# Patient Record
Sex: MALE | Race: WHITE | NOT HISPANIC OR LATINO | Employment: FULL TIME | URBAN - METROPOLITAN AREA
[De-identification: names, ages, dates, MRNs, and addresses within clinical notes are randomized per-mention and may not be internally consistent; named-entity substitution may affect disease eponyms.]

---

## 2017-03-27 ENCOUNTER — OFFICE VISIT - HEALTHEAST (OUTPATIENT)
Dept: FAMILY MEDICINE | Facility: CLINIC | Age: 52
End: 2017-03-27

## 2017-03-27 DIAGNOSIS — Z00.00 ROUTINE GENERAL MEDICAL EXAMINATION AT A HEALTH CARE FACILITY: ICD-10-CM

## 2017-03-27 DIAGNOSIS — Z12.5 SCREENING FOR PROSTATE CANCER: ICD-10-CM

## 2017-03-27 LAB
CHOLEST SERPL-MCNC: 212 MG/DL
FASTING STATUS PATIENT QL REPORTED: YES
HDLC SERPL-MCNC: 74 MG/DL
LDLC SERPL CALC-MCNC: 125 MG/DL
PSA SERPL-MCNC: 1 NG/ML (ref 0–3.5)
TRIGL SERPL-MCNC: 64 MG/DL

## 2017-03-27 ASSESSMENT — MIFFLIN-ST. JEOR: SCORE: 1678.6

## 2017-03-28 ENCOUNTER — COMMUNICATION - HEALTHEAST (OUTPATIENT)
Dept: FAMILY MEDICINE | Facility: CLINIC | Age: 52
End: 2017-03-28

## 2017-06-29 ENCOUNTER — OFFICE VISIT - HEALTHEAST (OUTPATIENT)
Dept: FAMILY MEDICINE | Facility: CLINIC | Age: 52
End: 2017-06-29

## 2017-06-29 DIAGNOSIS — M54.50 ACUTE LEFT-SIDED LOW BACK PAIN WITHOUT SCIATICA: ICD-10-CM

## 2017-06-29 DIAGNOSIS — M25.512 ACUTE PAIN OF LEFT SHOULDER: ICD-10-CM

## 2017-06-29 ASSESSMENT — MIFFLIN-ST. JEOR: SCORE: 1683.71

## 2017-07-10 ENCOUNTER — OFFICE VISIT - HEALTHEAST (OUTPATIENT)
Dept: PHYSICAL THERAPY | Facility: REHABILITATION | Age: 52
End: 2017-07-10

## 2017-07-10 DIAGNOSIS — G89.29 CHRONIC BILATERAL LOW BACK PAIN WITHOUT SCIATICA: ICD-10-CM

## 2017-07-10 DIAGNOSIS — R53.1 DECREASED STRENGTH: ICD-10-CM

## 2017-07-10 DIAGNOSIS — M54.50 CHRONIC BILATERAL LOW BACK PAIN WITHOUT SCIATICA: ICD-10-CM

## 2017-07-10 DIAGNOSIS — M25.812 SHOULDER IMPINGEMENT, LEFT: ICD-10-CM

## 2017-07-18 ENCOUNTER — OFFICE VISIT - HEALTHEAST (OUTPATIENT)
Dept: PHYSICAL THERAPY | Facility: REHABILITATION | Age: 52
End: 2017-07-18

## 2017-07-18 DIAGNOSIS — G89.29 CHRONIC BILATERAL LOW BACK PAIN WITHOUT SCIATICA: ICD-10-CM

## 2017-07-18 DIAGNOSIS — M25.812 SHOULDER IMPINGEMENT, LEFT: ICD-10-CM

## 2017-07-18 DIAGNOSIS — R53.1 DECREASED STRENGTH: ICD-10-CM

## 2017-07-18 DIAGNOSIS — M54.50 CHRONIC BILATERAL LOW BACK PAIN WITHOUT SCIATICA: ICD-10-CM

## 2017-07-20 ENCOUNTER — OFFICE VISIT - HEALTHEAST (OUTPATIENT)
Dept: PHYSICAL THERAPY | Facility: REHABILITATION | Age: 52
End: 2017-07-20

## 2017-07-20 DIAGNOSIS — G89.29 CHRONIC BILATERAL LOW BACK PAIN WITHOUT SCIATICA: ICD-10-CM

## 2017-07-20 DIAGNOSIS — M25.812 SHOULDER IMPINGEMENT, LEFT: ICD-10-CM

## 2017-07-20 DIAGNOSIS — M54.50 CHRONIC BILATERAL LOW BACK PAIN WITHOUT SCIATICA: ICD-10-CM

## 2017-07-20 DIAGNOSIS — R53.1 DECREASED STRENGTH: ICD-10-CM

## 2017-07-25 ENCOUNTER — OFFICE VISIT - HEALTHEAST (OUTPATIENT)
Dept: PHYSICAL THERAPY | Facility: REHABILITATION | Age: 52
End: 2017-07-25

## 2017-07-25 DIAGNOSIS — M54.50 CHRONIC BILATERAL LOW BACK PAIN WITHOUT SCIATICA: ICD-10-CM

## 2017-07-25 DIAGNOSIS — G89.29 CHRONIC BILATERAL LOW BACK PAIN WITHOUT SCIATICA: ICD-10-CM

## 2017-07-25 DIAGNOSIS — R53.1 DECREASED STRENGTH: ICD-10-CM

## 2017-07-25 DIAGNOSIS — M25.812 SHOULDER IMPINGEMENT, LEFT: ICD-10-CM

## 2017-07-27 ENCOUNTER — OFFICE VISIT - HEALTHEAST (OUTPATIENT)
Dept: PHYSICAL THERAPY | Facility: REHABILITATION | Age: 52
End: 2017-07-27

## 2017-07-27 DIAGNOSIS — G89.29 CHRONIC BILATERAL LOW BACK PAIN WITHOUT SCIATICA: ICD-10-CM

## 2017-07-27 DIAGNOSIS — M25.812 SHOULDER IMPINGEMENT, LEFT: ICD-10-CM

## 2017-07-27 DIAGNOSIS — R53.1 DECREASED STRENGTH: ICD-10-CM

## 2017-07-27 DIAGNOSIS — M54.50 CHRONIC BILATERAL LOW BACK PAIN WITHOUT SCIATICA: ICD-10-CM

## 2017-08-01 ENCOUNTER — OFFICE VISIT - HEALTHEAST (OUTPATIENT)
Dept: PHYSICAL THERAPY | Facility: REHABILITATION | Age: 52
End: 2017-08-01

## 2017-08-01 DIAGNOSIS — M54.50 CHRONIC BILATERAL LOW BACK PAIN WITHOUT SCIATICA: ICD-10-CM

## 2017-08-01 DIAGNOSIS — G89.29 CHRONIC BILATERAL LOW BACK PAIN WITHOUT SCIATICA: ICD-10-CM

## 2017-08-01 DIAGNOSIS — R53.1 DECREASED STRENGTH: ICD-10-CM

## 2017-08-01 DIAGNOSIS — M25.812 SHOULDER IMPINGEMENT, LEFT: ICD-10-CM

## 2017-08-03 ENCOUNTER — HOSPITAL ENCOUNTER (OUTPATIENT)
Dept: PHYSICAL MEDICINE AND REHAB | Facility: CLINIC | Age: 52
Discharge: HOME | End: 2017-08-03
Attending: NURSE PRACTITIONER

## 2017-08-03 ENCOUNTER — OFFICE VISIT - HEALTHEAST (OUTPATIENT)
Dept: PHYSICAL THERAPY | Facility: REHABILITATION | Age: 52
End: 2017-08-03

## 2017-08-03 DIAGNOSIS — G47.9 SLEEP DIFFICULTIES: ICD-10-CM

## 2017-08-03 DIAGNOSIS — M25.812 SHOULDER IMPINGEMENT, LEFT: ICD-10-CM

## 2017-08-03 DIAGNOSIS — R53.1 DECREASED STRENGTH: ICD-10-CM

## 2017-08-03 DIAGNOSIS — M54.50 CHRONIC LEFT-SIDED LOW BACK PAIN WITHOUT SCIATICA: ICD-10-CM

## 2017-08-03 DIAGNOSIS — G89.29 CHRONIC LEFT-SIDED LOW BACK PAIN WITHOUT SCIATICA: ICD-10-CM

## 2017-08-08 ENCOUNTER — HOSPITAL ENCOUNTER (OUTPATIENT)
Dept: MRI IMAGING | Facility: CLINIC | Age: 52
Discharge: HOME | End: 2017-08-08

## 2017-08-08 DIAGNOSIS — G89.29 CHRONIC LEFT-SIDED LOW BACK PAIN WITHOUT SCIATICA: ICD-10-CM

## 2017-08-08 DIAGNOSIS — M54.50 CHRONIC LEFT-SIDED LOW BACK PAIN WITHOUT SCIATICA: ICD-10-CM

## 2017-08-17 ENCOUNTER — HOSPITAL ENCOUNTER (OUTPATIENT)
Dept: PHYSICAL MEDICINE AND REHAB | Facility: CLINIC | Age: 52
Discharge: HOME | End: 2017-08-17
Attending: NURSE PRACTITIONER

## 2017-08-17 DIAGNOSIS — M47.816 LUMBAR FACET ARTHROPATHY: ICD-10-CM

## 2017-08-17 DIAGNOSIS — M54.50 CHRONIC LEFT-SIDED LOW BACK PAIN WITHOUT SCIATICA: ICD-10-CM

## 2017-08-17 DIAGNOSIS — G89.29 CHRONIC LEFT-SIDED LOW BACK PAIN WITHOUT SCIATICA: ICD-10-CM

## 2017-08-21 ENCOUNTER — OFFICE VISIT - HEALTHEAST (OUTPATIENT)
Dept: PHYSICAL THERAPY | Facility: CLINIC | Age: 52
End: 2017-08-21

## 2017-08-21 DIAGNOSIS — M54.50 CHRONIC BILATERAL LOW BACK PAIN WITHOUT SCIATICA: ICD-10-CM

## 2017-08-21 DIAGNOSIS — G89.29 CHRONIC BILATERAL LOW BACK PAIN WITHOUT SCIATICA: ICD-10-CM

## 2017-08-21 DIAGNOSIS — R53.1 DECREASED STRENGTH: ICD-10-CM

## 2017-08-21 DIAGNOSIS — M25.812 SHOULDER IMPINGEMENT, LEFT: ICD-10-CM

## 2017-08-23 ENCOUNTER — AMBULATORY - HEALTHEAST (OUTPATIENT)
Dept: PHYSICAL MEDICINE AND REHAB | Facility: CLINIC | Age: 52
End: 2017-08-23

## 2017-08-23 DIAGNOSIS — M54.50 LUMBAR SPINE PAIN: ICD-10-CM

## 2017-08-28 ENCOUNTER — OFFICE VISIT - HEALTHEAST (OUTPATIENT)
Dept: PHYSICAL THERAPY | Facility: CLINIC | Age: 52
End: 2017-08-28

## 2017-08-28 DIAGNOSIS — R53.1 DECREASED STRENGTH: ICD-10-CM

## 2017-08-28 DIAGNOSIS — G89.29 CHRONIC BILATERAL LOW BACK PAIN WITHOUT SCIATICA: ICD-10-CM

## 2017-08-28 DIAGNOSIS — M54.50 CHRONIC BILATERAL LOW BACK PAIN WITHOUT SCIATICA: ICD-10-CM

## 2017-08-28 DIAGNOSIS — M25.812 SHOULDER IMPINGEMENT, LEFT: ICD-10-CM

## 2017-08-30 ENCOUNTER — OFFICE VISIT - HEALTHEAST (OUTPATIENT)
Dept: PHYSICAL THERAPY | Facility: CLINIC | Age: 52
End: 2017-08-30

## 2017-08-30 DIAGNOSIS — M25.812 SHOULDER IMPINGEMENT, LEFT: ICD-10-CM

## 2017-08-30 DIAGNOSIS — G89.29 CHRONIC BILATERAL LOW BACK PAIN WITHOUT SCIATICA: ICD-10-CM

## 2017-08-30 DIAGNOSIS — M54.50 CHRONIC BILATERAL LOW BACK PAIN WITHOUT SCIATICA: ICD-10-CM

## 2017-08-30 DIAGNOSIS — R53.1 DECREASED STRENGTH: ICD-10-CM

## 2017-09-06 ENCOUNTER — OFFICE VISIT - HEALTHEAST (OUTPATIENT)
Dept: PHYSICAL THERAPY | Facility: CLINIC | Age: 52
End: 2017-09-06

## 2017-09-06 DIAGNOSIS — M25.812 SHOULDER IMPINGEMENT, LEFT: ICD-10-CM

## 2017-09-06 DIAGNOSIS — R53.1 DECREASED STRENGTH: ICD-10-CM

## 2017-09-06 DIAGNOSIS — M54.50 CHRONIC BILATERAL LOW BACK PAIN WITHOUT SCIATICA: ICD-10-CM

## 2017-09-06 DIAGNOSIS — G89.29 CHRONIC BILATERAL LOW BACK PAIN WITHOUT SCIATICA: ICD-10-CM

## 2017-09-08 ENCOUNTER — OFFICE VISIT - HEALTHEAST (OUTPATIENT)
Dept: PHYSICAL THERAPY | Facility: CLINIC | Age: 52
End: 2017-09-08

## 2017-09-08 DIAGNOSIS — M54.50 CHRONIC BILATERAL LOW BACK PAIN WITHOUT SCIATICA: ICD-10-CM

## 2017-09-08 DIAGNOSIS — M25.812 SHOULDER IMPINGEMENT, LEFT: ICD-10-CM

## 2017-09-08 DIAGNOSIS — R53.1 DECREASED STRENGTH: ICD-10-CM

## 2017-09-08 DIAGNOSIS — G89.29 CHRONIC BILATERAL LOW BACK PAIN WITHOUT SCIATICA: ICD-10-CM

## 2017-09-20 ENCOUNTER — OFFICE VISIT - HEALTHEAST (OUTPATIENT)
Dept: PHYSICAL THERAPY | Facility: CLINIC | Age: 52
End: 2017-09-20

## 2017-09-20 ENCOUNTER — AMBULATORY - HEALTHEAST (OUTPATIENT)
Dept: FAMILY MEDICINE | Facility: CLINIC | Age: 52
End: 2017-09-20

## 2017-09-20 DIAGNOSIS — M25.812 SHOULDER IMPINGEMENT, LEFT: ICD-10-CM

## 2017-09-20 DIAGNOSIS — R53.1 DECREASED STRENGTH: ICD-10-CM

## 2017-09-20 DIAGNOSIS — G89.29 CHRONIC BILATERAL LOW BACK PAIN WITHOUT SCIATICA: ICD-10-CM

## 2017-09-20 DIAGNOSIS — M25.512 ACUTE PAIN OF LEFT SHOULDER: ICD-10-CM

## 2017-09-20 DIAGNOSIS — M54.50 CHRONIC BILATERAL LOW BACK PAIN WITHOUT SCIATICA: ICD-10-CM

## 2017-09-25 ENCOUNTER — OFFICE VISIT - HEALTHEAST (OUTPATIENT)
Dept: PHYSICAL THERAPY | Facility: CLINIC | Age: 52
End: 2017-09-25

## 2017-09-25 DIAGNOSIS — R53.1 DECREASED STRENGTH: ICD-10-CM

## 2017-09-25 DIAGNOSIS — M54.50 CHRONIC BILATERAL LOW BACK PAIN WITHOUT SCIATICA: ICD-10-CM

## 2017-09-25 DIAGNOSIS — G89.29 CHRONIC BILATERAL LOW BACK PAIN WITHOUT SCIATICA: ICD-10-CM

## 2017-09-25 DIAGNOSIS — M25.812 SHOULDER IMPINGEMENT, LEFT: ICD-10-CM

## 2017-09-26 ENCOUNTER — RECORDS - HEALTHEAST (OUTPATIENT)
Dept: ADMINISTRATIVE | Facility: OTHER | Age: 52
End: 2017-09-26

## 2017-09-27 ENCOUNTER — OFFICE VISIT - HEALTHEAST (OUTPATIENT)
Dept: PHYSICAL THERAPY | Facility: CLINIC | Age: 52
End: 2017-09-27

## 2017-09-27 DIAGNOSIS — G89.29 CHRONIC BILATERAL LOW BACK PAIN WITHOUT SCIATICA: ICD-10-CM

## 2017-09-27 DIAGNOSIS — M54.50 CHRONIC BILATERAL LOW BACK PAIN WITHOUT SCIATICA: ICD-10-CM

## 2017-09-27 DIAGNOSIS — R53.1 DECREASED STRENGTH: ICD-10-CM

## 2017-09-27 DIAGNOSIS — M25.812 SHOULDER IMPINGEMENT, LEFT: ICD-10-CM

## 2017-10-04 ENCOUNTER — OFFICE VISIT - HEALTHEAST (OUTPATIENT)
Dept: PHYSICAL THERAPY | Facility: CLINIC | Age: 52
End: 2017-10-04

## 2017-10-04 DIAGNOSIS — R53.1 DECREASED STRENGTH: ICD-10-CM

## 2017-10-04 DIAGNOSIS — M54.50 CHRONIC BILATERAL LOW BACK PAIN WITHOUT SCIATICA: ICD-10-CM

## 2017-10-04 DIAGNOSIS — M25.812 SHOULDER IMPINGEMENT, LEFT: ICD-10-CM

## 2017-10-04 DIAGNOSIS — G89.29 CHRONIC BILATERAL LOW BACK PAIN WITHOUT SCIATICA: ICD-10-CM

## 2017-10-09 ENCOUNTER — OFFICE VISIT - HEALTHEAST (OUTPATIENT)
Dept: PHYSICAL THERAPY | Facility: CLINIC | Age: 52
End: 2017-10-09

## 2017-10-09 DIAGNOSIS — R53.1 DECREASED STRENGTH: ICD-10-CM

## 2017-10-09 DIAGNOSIS — M25.812 SHOULDER IMPINGEMENT, LEFT: ICD-10-CM

## 2017-10-09 DIAGNOSIS — G89.29 CHRONIC BILATERAL LOW BACK PAIN WITHOUT SCIATICA: ICD-10-CM

## 2017-10-09 DIAGNOSIS — M54.50 CHRONIC BILATERAL LOW BACK PAIN WITHOUT SCIATICA: ICD-10-CM

## 2018-03-13 ENCOUNTER — COMMUNICATION - HEALTHEAST (OUTPATIENT)
Dept: TELEHEALTH | Facility: CLINIC | Age: 53
End: 2018-03-13

## 2018-03-13 ENCOUNTER — OFFICE VISIT - HEALTHEAST (OUTPATIENT)
Dept: FAMILY MEDICINE | Facility: CLINIC | Age: 53
End: 2018-03-13

## 2018-03-13 DIAGNOSIS — Z12.11 SCREEN FOR COLON CANCER: ICD-10-CM

## 2018-03-13 DIAGNOSIS — F32.A DEPRESSION: ICD-10-CM

## 2018-03-13 DIAGNOSIS — I10 ESSENTIAL HYPERTENSION: ICD-10-CM

## 2018-03-13 DIAGNOSIS — F41.9 ANXIETY: ICD-10-CM

## 2018-03-13 LAB
ALBUMIN SERPL-MCNC: 4 G/DL (ref 3.5–5)
ALP SERPL-CCNC: 58 U/L (ref 45–120)
ALT SERPL W P-5'-P-CCNC: 26 U/L (ref 0–45)
ANION GAP SERPL CALCULATED.3IONS-SCNC: 8 MMOL/L (ref 5–18)
AST SERPL W P-5'-P-CCNC: 20 U/L (ref 0–40)
BILIRUB SERPL-MCNC: 0.7 MG/DL (ref 0–1)
BUN SERPL-MCNC: 12 MG/DL (ref 8–22)
CALCIUM SERPL-MCNC: 9.7 MG/DL (ref 8.5–10.5)
CHLORIDE BLD-SCNC: 104 MMOL/L (ref 98–107)
CHOLEST SERPL-MCNC: 231 MG/DL
CO2 SERPL-SCNC: 27 MMOL/L (ref 22–31)
CREAT SERPL-MCNC: 0.79 MG/DL (ref 0.7–1.3)
FASTING STATUS PATIENT QL REPORTED: YES
GFR SERPL CREATININE-BSD FRML MDRD: >60 ML/MIN/1.73M2
GLUCOSE BLD-MCNC: 109 MG/DL (ref 70–125)
HDLC SERPL-MCNC: 74 MG/DL
LDLC SERPL CALC-MCNC: 139 MG/DL
POTASSIUM BLD-SCNC: 3.9 MMOL/L (ref 3.5–5)
PROT SERPL-MCNC: 7.1 G/DL (ref 6–8)
SODIUM SERPL-SCNC: 139 MMOL/L (ref 136–145)
TRIGL SERPL-MCNC: 90 MG/DL
TSH SERPL DL<=0.005 MIU/L-ACNC: 1.65 UIU/ML (ref 0.3–5)

## 2018-03-13 ASSESSMENT — MIFFLIN-ST. JEOR: SCORE: 1704.12

## 2018-03-14 ENCOUNTER — COMMUNICATION - HEALTHEAST (OUTPATIENT)
Dept: FAMILY MEDICINE | Facility: CLINIC | Age: 53
End: 2018-03-14

## 2018-03-24 ENCOUNTER — COMMUNICATION - HEALTHEAST (OUTPATIENT)
Dept: FAMILY MEDICINE | Facility: CLINIC | Age: 53
End: 2018-03-24

## 2018-03-24 DIAGNOSIS — Z00.00 PREVENTATIVE HEALTH CARE: ICD-10-CM

## 2018-03-28 ENCOUNTER — COMMUNICATION - HEALTHEAST (OUTPATIENT)
Dept: FAMILY MEDICINE | Facility: CLINIC | Age: 53
End: 2018-03-28

## 2018-03-28 ENCOUNTER — AMBULATORY - HEALTHEAST (OUTPATIENT)
Dept: NURSING | Facility: CLINIC | Age: 53
End: 2018-03-28

## 2018-04-10 ENCOUNTER — RECORDS - HEALTHEAST (OUTPATIENT)
Dept: ADMINISTRATIVE | Facility: OTHER | Age: 53
End: 2018-04-10

## 2018-04-18 ENCOUNTER — COMMUNICATION - HEALTHEAST (OUTPATIENT)
Dept: FAMILY MEDICINE | Facility: CLINIC | Age: 53
End: 2018-04-18

## 2018-05-11 ENCOUNTER — COMMUNICATION - HEALTHEAST (OUTPATIENT)
Dept: FAMILY MEDICINE | Facility: CLINIC | Age: 53
End: 2018-05-11

## 2018-05-11 DIAGNOSIS — I10 ESSENTIAL HYPERTENSION: ICD-10-CM

## 2018-10-03 ENCOUNTER — COMMUNICATION - HEALTHEAST (OUTPATIENT)
Dept: FAMILY MEDICINE | Facility: CLINIC | Age: 53
End: 2018-10-03

## 2018-10-05 ENCOUNTER — RECORDS - HEALTHEAST (OUTPATIENT)
Dept: ADMINISTRATIVE | Facility: OTHER | Age: 53
End: 2018-10-05

## 2019-03-01 ENCOUNTER — OFFICE VISIT - HEALTHEAST (OUTPATIENT)
Dept: FAMILY MEDICINE | Facility: CLINIC | Age: 54
End: 2019-03-01

## 2019-03-01 DIAGNOSIS — F41.9 ANXIETY: ICD-10-CM

## 2019-03-01 DIAGNOSIS — F33.1 MODERATE EPISODE OF RECURRENT MAJOR DEPRESSIVE DISORDER (H): ICD-10-CM

## 2019-03-01 DIAGNOSIS — B07.9 VIRAL WARTS, UNSPECIFIED TYPE: ICD-10-CM

## 2019-03-01 DIAGNOSIS — I10 ESSENTIAL HYPERTENSION: ICD-10-CM

## 2019-03-01 LAB
ANION GAP SERPL CALCULATED.3IONS-SCNC: 11 MMOL/L (ref 5–18)
BUN SERPL-MCNC: 12 MG/DL (ref 8–22)
CALCIUM SERPL-MCNC: 10.7 MG/DL (ref 8.5–10.5)
CHLORIDE BLD-SCNC: 103 MMOL/L (ref 98–107)
CO2 SERPL-SCNC: 28 MMOL/L (ref 22–31)
CREAT SERPL-MCNC: 0.93 MG/DL (ref 0.7–1.3)
GFR SERPL CREATININE-BSD FRML MDRD: >60 ML/MIN/1.73M2
GLUCOSE BLD-MCNC: 125 MG/DL (ref 70–125)
POTASSIUM BLD-SCNC: 4.7 MMOL/L (ref 3.5–5)
SODIUM SERPL-SCNC: 142 MMOL/L (ref 136–145)

## 2019-03-01 ASSESSMENT — MIFFLIN-ST. JEOR: SCORE: 1688.81

## 2019-04-09 ENCOUNTER — COMMUNICATION - HEALTHEAST (OUTPATIENT)
Dept: FAMILY MEDICINE | Facility: CLINIC | Age: 54
End: 2019-04-09

## 2019-05-10 ENCOUNTER — AMBULATORY - HEALTHEAST (OUTPATIENT)
Dept: FAMILY MEDICINE | Facility: CLINIC | Age: 54
End: 2019-05-10

## 2019-05-10 ENCOUNTER — COMMUNICATION - HEALTHEAST (OUTPATIENT)
Dept: FAMILY MEDICINE | Facility: CLINIC | Age: 54
End: 2019-05-10

## 2019-05-10 DIAGNOSIS — I10 ESSENTIAL HYPERTENSION: ICD-10-CM

## 2019-07-20 ENCOUNTER — COMMUNICATION - HEALTHEAST (OUTPATIENT)
Dept: FAMILY MEDICINE | Facility: CLINIC | Age: 54
End: 2019-07-20

## 2019-07-20 DIAGNOSIS — I10 ESSENTIAL HYPERTENSION: ICD-10-CM

## 2020-07-30 ENCOUNTER — COMMUNICATION - HEALTHEAST (OUTPATIENT)
Dept: FAMILY MEDICINE | Facility: CLINIC | Age: 55
End: 2020-07-30

## 2020-07-30 DIAGNOSIS — I10 ESSENTIAL HYPERTENSION: ICD-10-CM

## 2020-08-11 ENCOUNTER — OFFICE VISIT - HEALTHEAST (OUTPATIENT)
Dept: FAMILY MEDICINE | Facility: CLINIC | Age: 55
End: 2020-08-11

## 2020-08-11 DIAGNOSIS — Z12.5 SCREENING FOR PROSTATE CANCER: ICD-10-CM

## 2020-08-11 DIAGNOSIS — I10 ESSENTIAL HYPERTENSION: ICD-10-CM

## 2020-08-11 DIAGNOSIS — Z13.220 LIPID SCREENING: ICD-10-CM

## 2020-08-11 DIAGNOSIS — Z71.89 COUNSELING ON HEALTH CARE DIRECTIVE: ICD-10-CM

## 2020-08-11 DIAGNOSIS — F33.1 MODERATE EPISODE OF RECURRENT MAJOR DEPRESSIVE DISORDER (H): ICD-10-CM

## 2020-08-11 DIAGNOSIS — Z00.00 ANNUAL PHYSICAL EXAM: ICD-10-CM

## 2020-08-11 DIAGNOSIS — F41.9 ANXIETY: ICD-10-CM

## 2020-08-11 RX ORDER — LISINOPRIL 10 MG/1
TABLET ORAL
Qty: 90 TABLET | Refills: 3 | Status: SHIPPED | OUTPATIENT
Start: 2020-08-11 | End: 2021-09-05

## 2020-08-11 RX ORDER — CHLORAL HYDRATE 500 MG
2 CAPSULE ORAL DAILY
Status: SHIPPED | COMMUNITY
Start: 2020-08-11 | End: 2023-06-28

## 2020-08-11 ASSESSMENT — ANXIETY QUESTIONNAIRES
7. FEELING AFRAID AS IF SOMETHING AWFUL MIGHT HAPPEN: SEVERAL DAYS
1. FEELING NERVOUS, ANXIOUS, OR ON EDGE: SEVERAL DAYS
2. NOT BEING ABLE TO STOP OR CONTROL WORRYING: SEVERAL DAYS
6. BECOMING EASILY ANNOYED OR IRRITABLE: SEVERAL DAYS
5. BEING SO RESTLESS THAT IT IS HARD TO SIT STILL: SEVERAL DAYS
3. WORRYING TOO MUCH ABOUT DIFFERENT THINGS: SEVERAL DAYS
4. TROUBLE RELAXING: SEVERAL DAYS
IF YOU CHECKED OFF ANY PROBLEMS ON THIS QUESTIONNAIRE, HOW DIFFICULT HAVE THESE PROBLEMS MADE IT FOR YOU TO DO YOUR WORK, TAKE CARE OF THINGS AT HOME, OR GET ALONG WITH OTHER PEOPLE: SOMEWHAT DIFFICULT
GAD7 TOTAL SCORE: 7

## 2020-08-11 ASSESSMENT — PATIENT HEALTH QUESTIONNAIRE - PHQ9: SUM OF ALL RESPONSES TO PHQ QUESTIONS 1-9: 10

## 2020-08-11 ASSESSMENT — MIFFLIN-ST. JEOR: SCORE: 1603.76

## 2020-08-12 LAB
ANION GAP SERPL CALCULATED.3IONS-SCNC: 12 MMOL/L (ref 5–18)
BUN SERPL-MCNC: 16 MG/DL (ref 8–22)
CALCIUM SERPL-MCNC: 9.3 MG/DL (ref 8.5–10.5)
CHLORIDE BLD-SCNC: 109 MMOL/L (ref 98–107)
CHOLEST SERPL-MCNC: 206 MG/DL
CO2 SERPL-SCNC: 20 MMOL/L (ref 22–31)
CREAT SERPL-MCNC: 0.87 MG/DL (ref 0.7–1.3)
FASTING STATUS PATIENT QL REPORTED: NO
GFR SERPL CREATININE-BSD FRML MDRD: >60 ML/MIN/1.73M2
GLUCOSE BLD-MCNC: 95 MG/DL (ref 70–125)
HDLC SERPL-MCNC: 70 MG/DL
LDLC SERPL CALC-MCNC: 111 MG/DL
POTASSIUM BLD-SCNC: 4 MMOL/L (ref 3.5–5)
PSA SERPL-MCNC: 1.3 NG/ML (ref 0–3.5)
SODIUM SERPL-SCNC: 141 MMOL/L (ref 136–145)
TRIGL SERPL-MCNC: 126 MG/DL

## 2021-05-27 ASSESSMENT — PATIENT HEALTH QUESTIONNAIRE - PHQ9: SUM OF ALL RESPONSES TO PHQ QUESTIONS 1-9: 10

## 2021-05-28 ASSESSMENT — ANXIETY QUESTIONNAIRES: GAD7 TOTAL SCORE: 7

## 2021-05-30 VITALS — HEIGHT: 71 IN | WEIGHT: 180 LBS | BODY MASS INDEX: 25.2 KG/M2

## 2021-05-31 VITALS — BODY MASS INDEX: 25.14 KG/M2 | WEIGHT: 179 LBS

## 2021-05-31 VITALS — WEIGHT: 182 LBS | BODY MASS INDEX: 25.48 KG/M2 | HEIGHT: 71 IN

## 2021-05-31 VITALS — WEIGHT: 181 LBS | BODY MASS INDEX: 25.42 KG/M2

## 2021-06-01 VITALS — BODY MASS INDEX: 26.11 KG/M2 | HEIGHT: 71 IN | WEIGHT: 186.5 LBS

## 2021-06-02 VITALS — BODY MASS INDEX: 25.76 KG/M2 | HEIGHT: 71 IN | WEIGHT: 184 LBS

## 2021-06-04 VITALS
HEART RATE: 63 BPM | WEIGHT: 167 LBS | OXYGEN SATURATION: 97 % | DIASTOLIC BLOOD PRESSURE: 80 MMHG | BODY MASS INDEX: 23.91 KG/M2 | HEIGHT: 70 IN | SYSTOLIC BLOOD PRESSURE: 136 MMHG
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
## 2021-06-10 NOTE — TELEPHONE ENCOUNTER
Pt notified due for visit to [facility] with a new provider- appt made for 8/11 with annabella morales. Please adv for possible short refill? Last labs were done in 2018.

## 2021-06-10 NOTE — PROGRESS NOTES
Assessment:      Healthy male exam.      Plan:     1. Annual physical exam     2. Essential hypertension  Basic Metabolic Panel    lisinopriL (PRINIVIL,ZESTRIL) 10 MG tablet   3. Anxiety     4. Moderate episode of recurrent major depressive disorder (H)     5. Counseling on health care directive     6. Screening for prostate cancer  PSA, Annual Screen (Prostatic-Specific Antigen)   7. Lipid screening  Lipid Cascade RANDOM     Overall doing well.  Update labs.  Screening PSA.  Refill lisinopril sent to the pharmacy.  Check back again next year.    Subjective:      García Trevizo is a 55 y.o. male who presents for an annual exam. The patient reports that there is not domestic violence in his life.     Overall things are going well.  No specific concerns.  Cologuard negative in 2018.  Has lost about 15 pounds over the last year.  Blood pressure under good control with lisinopril.  History of anxiety and depression, but managing well without medication.    PHQ-9 Total Score: 10 (8/11/2020  5:00 PM)  JV 7 Total Score: 7 (8/11/2020  5:00 PM)    Healthy Habits:   Regular Exercise: Yes  Sunscreen Use: No  Healthy Diet: Moderatly healthy  Dental Visits Regularly: No  Seat Belt: Yes  Sexually active: Yes  Monthly Self Testicular Exams:  Yes  Hemoccults: No  Flex Sig: No  Colonoscopy: Had negative cologuard in 2018.  Lipid Profile: Yes  Glucose Screen: Yes  Prevention of Osteoporosis: Takes a multivitamin.   Last Dexa: No  Guns at Home:  Yes  Guns Safety Locks:  No and Gun safe/class:  No      Immunization History   Administered Date(s) Administered     Influenza,seasonal quad, PF, =/> 6months 11/05/2017     Td, Adult, Absorbed 07/31/2003, 04/10/2006     Tdap 03/27/2017     Immunization status: up to date and documented.    No exam data present    Current Outpatient Medications   Medication Sig Dispense Refill     fish oil-omega-3 fatty acids (FISH OIL) 300-1,000 mg capsule Take 2 g by mouth daily.       lisinopriL  (PRINIVIL,ZESTRIL) 10 MG tablet TAKE 1 TABLET DAILY 90 tablet 3     MULTIVIT-MINERALS/FERROUS FUM (MULTI VITAMIN ORAL) Take by mouth.       No current facility-administered medications for this visit.      Past Medical History:   Diagnosis Date     Anxiety      Hydrocele      Past Surgical History:   Procedure Laterality Date     FINGER AMPUTATION Left     Press accident, 3 fingers     HYDROCELE EXCISION / REPAIR       Patient has no known allergies.  Family History   Problem Relation Age of Onset     Lung cancer Mother          at 61     Hypertension Father      Diabetes Father      Heart attack Father          2017     Diabetes Sister      Anxiety disorder Sister      Social History     Socioeconomic History     Marital status:      Spouse name: Not on file     Number of children: 3     Years of education: 14     Highest education level: Not on file   Occupational History     Occupation:      Comment: Resigned 3/2017   Social Needs     Financial resource strain: Not on file     Food insecurity     Worry: Not on file     Inability: Not on file     Transportation needs     Medical: Not on file     Non-medical: Not on file   Tobacco Use     Smoking status: Former Smoker     Last attempt to quit: 3/27/2007     Years since quittin.3     Smokeless tobacco: Never Used     Tobacco comment: very light smoker , only 1  year    Substance and Sexual Activity     Alcohol use: Not Currently     Drug use: No     Sexual activity: Yes     Partners: Female   Lifestyle     Physical activity     Days per week: Not on file     Minutes per session: Not on file     Stress: Not on file   Relationships     Social connections     Talks on phone: Not on file     Gets together: Not on file     Attends Bahai service: Not on file     Active member of club or organization: Not on file     Attends meetings of clubs or organizations: Not on file     Relationship status: Not on file     Intimate  "partner violence     Fear of current or ex partner: Not on file     Emotionally abused: Not on file     Physically abused: Not on file     Forced sexual activity: Not on file   Other Topics Concern     Not on file   Social History Narrative    Lives with female partner, 3 sons are independent and employed.         Review of Systems  General:  Denies problem  Eyes: Denies problem  Ears/Nose/Throat: Denies problem  Cardiovascular: Denies problem  Respiratory:  Denies problem  Gastrointestinal:  Denies problem  Genitourinary: Denies problem  Musculoskeletal:  Denies problem  Skin: Denies problem  Neurologic: Denies problem  Psychiatric: Denies problem  Endocrine: Denies problem  Heme/Lymphatic: Denies problem   Allergic/Immunologic: Denies problem        Objective:     Vitals:    08/11/20 1626   BP: 136/80   Pulse: 63   SpO2: 97%   Weight: 167 lb (75.8 kg)   Height: 5' 10\" (1.778 m)     Body mass index is 23.96 kg/m .    Physical  General Appearance: Alert, cooperative, no distress, appears stated age  Head: Normocephalic, without obvious abnormality, atraumatic  Eyes: PERRL, conjunctiva/corneas clear, EOM's intact  Ears: Normal TM's and external ear canals, both ears  Nose: Nares normal, septum midline,mucosa normal, no drainage  Throat: Lips, mucosa, and tongue normal; teeth and gums normal  Neck: Supple, symmetrical, trachea midline, no adenopathy;  thyroid: not enlarged, symmetric, no tenderness/mass/nodules; no carotid bruit or JVD  Back: Symmetric, no curvature, ROM normal, no CVA tenderness  Lungs: Clear to auscultation bilaterally, respirations unlabored  Heart: Regular rate and rhythm, S1 and S2 normal, no murmur, rub, or gallop,  Abdomen: Soft, non-tender, bowel sounds active all four quadrants,  no masses, no organomegaly  Genitourinary: Deferred   Musculoskeletal: Normal range of motion. No joint swelling or deformity.   Extremities: Extremities normal, atraumatic, no cyanosis or edema  Skin: Skin color, " texture, turgor normal, no rashes or lesions  Lymph nodes: Cervical, supraclavicular, and axillary nodes normal  Neurologic: He is alert. He has normal reflexes.   Psychiatric: He has a normal mood and affect.      Kye Green, CNP

## 2021-06-10 NOTE — TELEPHONE ENCOUNTER
I can certainly do a 30-day refill.  Does he want it sent to a local pharmacy so he can actually pick it up?  I see [facility] listed as the main pharmacy.    Kye Green, CNP

## 2021-06-10 NOTE — TELEPHONE ENCOUNTER
RN cannot approve Refill Request    RN can NOT refill this medication No established PCP. Last office visit: Visit date not found Last Physical: 3/27/2017 Last MTM visit: Visit date not found Last visit same specialty: 3/1/2019 Jovanna Whitaker NP.  Next visit within 3 mo: Visit date not found  Next physical within 3 mo: Visit date not found      Tiff Agudelo, [facility] Triage/Med Refill 8/1/2020    Requested Prescriptions   Pending Prescriptions Disp Refills     lisinopriL (PRINIVIL,ZESTRIL) 10 MG tablet [Pharmacy Med Name: LISINOPRIL TABS 10MG] 90 tablet 3     Sig: TAKE 1 TABLET DAILY       [facility] Inhibitors Refill Protocol Failed - 7/30/2020  6:08 PM        Failed - PCP or prescribing provider visit in past 12 months       Last office visit with prescriber/PCP: Visit date not found OR same dept: Visit date not found OR same specialty: 3/1/2019 Jovanna Whitaker NP  Last physical: 3/27/2017 Last MTM visit: Visit date not found   Next visit within 3 mo: Visit date not found  Next physical within 3 mo: Visit date not found  Prescriber OR PCP: Shira Ellington MD  Last diagnosis associated with med order: 1. Essential hypertension  - lisinopriL (PRINIVIL,ZESTRIL) 10 MG tablet [Pharmacy Med Name: LISINOPRIL TABS 10MG]; TAKE 1 TABLET DAILY  Dispense: 90 tablet; Refill: 3    If protocol passes may refill for 12 months if within 3 months of last provider visit (or a total of 15 months).             Failed - Serum Potassium in past 12 months     No results found for: LN-POTASSIUM          Failed - Blood pressure filed in past 12 months     BP Readings from Last 1 Encounters:   03/01/19 124/84             Failed - Serum Creatinine in past 12 months     Creatinine   Date Value Ref Range Status   03/01/2019 0.93 0.70 - 1.30 mg/dL Final

## 2021-06-10 NOTE — PATIENT INSTRUCTIONS - HE
"That shingles vaccine we talked about is called \"Shingrix\". Check with your insurance to see if they cover it. If they do and you're interested in getting it, let me know and we can have you come in for just the shot without having to see me.    Refills of lisinopril sent to the pharmacy.  "

## 2021-06-11 NOTE — PROGRESS NOTES
[facility] Daily Progress     Patient Name: García Trevizo  Date: 2017  Visit #:   [employer] Authorization date 10/8/17  Referral Diagnosis:  Acute left sided low back pain without sciatica, left shoulder pain.  Referring provider: Shira Ellington MD  Visit Diagnosis:     ICD-10-CM    1. Shoulder impingement, left M75.42    2. Chronic bilateral low back pain without sciatica M54.5     G89.29    3. Decreased strength M62.81          Assessment:     Response to Intervention:  Patient reports no increased pain.  He indicates he feels better today than after the first visit.    Symptoms are consistent with:    Patient is appropriate to continue with skilled physical therapy intervention, as indicated by initial plan of care.    Goal Status:  Pt. will demonstrate/verbalize independence in self-management of condition in : 4 weeks  Pt. will be independent with home exercise program in : 4 weeks  Pt. will have improved quality of sleep: with less pain;waking less times/night;in 4 weeks  Pt. will be able to walk : 20 minutes;with no pain;for exercise/recreation;in 4 weeks  Patient will stand : 30 minutes;with less pain;with less difficultty;for home chores;in 4 weeks  Patient will sit: 30 minutes;for eating;with no pain;in 4 weeks  Patient will reach / maintain arm movement: forward;overhead;with no pain;in 4 weeks  No Data Recorded  Other functional progress:          Plan / Patient Education:     Continue with initial plan of care.  Progress with home program as tolerated.  Scapular stabilization exercises, progress core, leg pull trial.    Subjective:     Pain Ratin    Back seems about the same.    Shoulder seems a little better.    Fishing off the dock.  After a while the low back cramped up.      Objective:       Significantly limited rectus femoris and hip flexor    Manual Therapy  Lumbar skin roll  MFR layers 1-3 lumbar paraspinals, left glut max,       Manual therapy:  Right rotational lumbar  "mobilization    Rate/grade Target  Direction  Relative movement Location in range Patient position   2 Posterior femur/pelvis Along the longitudinal axis of the femur   L5 rotating right on L4 With 45 degrees bilateral hip flexion and 90 degrees knee flexion Right side-lying.       Manual therapy:  Bilateral LE longitudinal lumbar mobilization.    Rate/grade Target  Direction  Relative movement Location in range Patient position   2 Hands around tibia and fibula of lower legs, superior to ankles Inferior force  Inferior distraction of Lumbar facet joints 20-30 degrees of hip flexion. Supine           Exercises below represent all exercise the patient has done in the clinic and HEP.  Please see today's exercise flow-sheet for specifics of today's exercise performance.     Exercises:  Exercise #1: manual stretching quad, hip flexor, piriformis  Comment #1: 30\" x 2 bilateral  Exercise #2: quad, hamstring, hip flexor, and piriformis stretch   Comment #2:  30\" bilateral each  Exercise #3: pec stretch-cues for keeping weight on feet and not leaning, reduced intiensity for stretch.  Comment #3: 30\" x 2   Exercise #4: scapular retraction  Comment #4: 5  Exercise #5: sleeper stretch cues for arm angle and intensity.  Comment #5: 30\" x 2   Exercise #6: pelvic tilt  Comment #6: 5\" x 10  Exercise #7: bridging low amplitude  Comment #7: x 10       Treatment Today    TREATMENT MINUTES COMMENTS   Evaluation     Self-care/ Home management     Manual therapy 25 See above.   Neuromuscular Re-education     Therapeutic Activity     Therapeutic Exercises 30 See exercise flow-sheet for details.    Gait training     Modality__________________                Total 55    Blank areas are intentional and mean the treatment did not include these items.       Bear Ma, PT  7/18/2017     "

## 2021-06-11 NOTE — PROGRESS NOTES
[facility]   [facility]-Pelvic Initial Evaluation    Patient Name: García Trevizo  Date of evaluation: 7/10/2017  Referral Diagnosis: Acute left-sided low back pain without sciatica  Referring provider: Jovanna Whitaker NP  Visit Diagnosis:     ICD-10-CM    1. Shoulder impingement, left M75.42    2. Chronic bilateral low back pain without sciatica M54.5     G89.29    3. Decreased strength M62.81        Assessment:      Impairments in  pain, posture, ROM, joint mobility, strength  Patient's signs and symptoms are consistent with lower crossed syndrome and left shoulder impingement.  Patient responded well to manual stretching and exercises.  Prognosis to achieve goals is  good   Pt. is appropriate for skilled PT intervention as outlined in the Plan of Care (POC).    Goals:  Pt. will demonstrate/verbalize independence in self-management of condition in : 4 weeks  Pt. will be independent with home exercise program in : 4 weeks  Pt. will have improved quality of sleep: with less pain;waking less times/night;in 4 weeks  Pt. will be able to walk : 20 minutes;with no pain;for exercise/recreation;in 4 weeks  Patient will stand : 30 minutes;with less pain;with less difficultty;for home chores;in 4 weeks  Patient will sit: 30 minutes;for eating;with no pain;in 4 weeks  Patient will reach / maintain arm movement: forward;overhead;with no pain;in 4 weeks  No Data Recorded    Patient's expectations/goals are realistic.    Barriers to Learning or Achieving Goals:  No Barriers.       Plan / Patient Instructions:        Plan of Care:   Authorization / Certification Start Date: 07/10/17  Authorization / Certification End Date: 10/08/17  Authorization / Certification Number of Visits: 12  Communication with: Referral Source  Patient Related Instruction: Nature of Condition;Treatment plan and rationale;Self Care instruction;Basis of treatment;Expected outcome;Next steps;Precautions;Posture;Body mechanics  Times per Week:  2  Number of Weeks: 6  Number of Visits: 12  Discharge Planning:    Precautions / Restrictions : h/o left fingers amputated.  Therapeutic Exercise: ROM;Stretching;Strengthening  Neuromuscular Reeducation: kinesio tape;posture;TNE;core  Manual Therapy: soft tissue mobilization;myofascial release;joint mobilization;muscle energy    POC and pathology of condition were reviewed with patient.  Pt. is in agreement with the Plan of Care  A Home Exercise Program (HEP) was initiated today.  Pt. was instructed in exercises by PT and patient was given a handout with detailed instructions.    Plan for next visit: recheck ROM, review HEP and continue manual therapy.     Subjective:           History of Present Illness:    García is a 51 y.o. male who presents to therapy today with complaints of left sided acute on chronic low  Back pain and left shoulder pain. Date of onset:  2017. Onset was gradual. Symptoms are intermittent and not improving. He reports  an episodic  history of similar symptoms. He describes their previous level of function as not limited    Pain Ratin  Pain rating at best: 5  Pain rating at worst: 9  Pain description: aching, dull, pain, sharp and soreness    Functional limitations are described as occurring with:   ascending and descending stairs or curbs  bending  lifting  reaching overhead  sitting    sleeping  standing    transitional movements sit to stand and sit to supine  walking             Objective:      Note: Items left blank indicates the item was not performed or not indicated at the time of the evaluation.    Patient Outcome Measures :    Modified Oswestry Low Back Pain Disablity Questionnaire  in %: 46   Scores range from 0-100%, where a score of 0% represents minimal pain and maximal function. The minimal clinically important difference is a score reduction of 12%.    Examination  1. Shoulder impingement, left     2. Chronic bilateral low back pain without sciatica     3. Decreased  strength       Precautions / Restrictions : h/o left fingers amputated.   Involved side: Left and Bilateral  Posture Observation:      Cervical:  Mild forward head  Shoulder/Thoracic complex: Moderate bilateral scapular protraction   Lumbopelvic complex: Moderately increased lumbar lordosis    Lumbar ROM:    Date: 7/10/17     *Indicate scale AROM AROM AROM   Lumbar Flexion mod     Lumbar Extension mod      Right Left Right Left Right Left   Lumbar Sidebending min min       Lumbar Rotation min min       Thoracic Flexion      Thoracic Extension      Thoracic Sidebending         Thoracic Rotation           Lower Extremity Strength:    Date: 7/10/17     LE strength/5 Right Left Right Left Right Left   Hip Flexion (L1-3) 5 5       Hip Extension (L5-S1) 4 4       Hip Abduction (L4-5) 4+ 4       Hip Adduction (L2-3) 4 4       Hip External Rotation         Hip Internal Rotation         Knee Extension (L3-4) 5 5       Knee Flexion 5 5       Ankle Dorsiflexion (L4-5) 5 5       Great Toe Extension (L5) 5 5       Ankle Plantar flexion (S1)         Abdominals Fair to good       Sensation    NT       Reflex Testing:  NT  Lumbar Dermatomes Right Left UE Reflexes Right Left   Iliac Crest and Groin (L1)   Biceps (C5-6)     Anterior Medial Thigh (L2)   Brachioradialis (C5-6)     Anterior Thigh, Medial Epicondyle Femur (L3)   Triceps (C7-8)     Lateral Thigh, Anterior Knee, Medial Leg/Malleolus (L4)   Tonny s test     Lateral Leg, Dorsal Foot (L5)   LE Reflexes     Lateral Foot (S1)   Patellar (L3-4)     Posterior Leg (S2)   Achilles (S1-2)     Other:   Babinski Response         Lumbar Special Tests:      Lumbar Special Tests Right Left SI Tests Right  Left   Quadrant test   SI Compression     Straight leg raise - - SI Distraction     Crossover response - - POSH Test - -   Slump - - Sacral Thrust - -   Sit-up test  FADIR     Trunk extensor endurance test  Resisted Abduction     Prone instability test  Other:     Pubic shotgun -  Other:       Repeated Motion Testing:  NT    Passive Mobility - Joint Integrity:  Hypomobile    LE Screen:  Hip PROM:  Decreased extension and IR bilateral .  Hip Scour:  Neg bilateral.      Shoulder/Elbow ROM  Date:      Shoulder and Elbow ROM ( )   AROM in degrees AROM in degrees AROM in degrees    Right Left Right Left Right Left   Shoulder Flexion (0-180 ) 150 150 with pain       Shoulder Abduction (0-180 ) 150 150 with pain       Shoulder Extension (0-60 )         Shoulder ER (0-90 ) 75 65 with minimal pain       Shoulder IR (0-70 )         Shoulder IR/EXT L1 L1 with pain       Elbow Flexion (150 )         Elbow Extension (0 )            Shoulder/Elbow Strength  Date:      Shoulder/Elbow Strength (/5)  Manual Muscle Test (MMT) MMT MMT MMT    Right Left Right Left Right Left   Shoulder Flexion 5 5       Supraspinatus         Shoulder Abduction 5 5       Shoulder Extension 5 5       Shoulder External Rotation 5 5       Shoulder Internal Rotation 5 5       Elbow Flexion 5 5       Elbow Extension 5 5       Other:         Other:             Shoulder Special Tests  Impingement Cluster Right (+/-) Left (+/-) Rotator Cuff Tests Right (+/-) Left (+/-)   Ureña-Christoph  + Drop Arm Sign  -   Painful Arc  + Hornblowers     Infraspinatus Test  + ERLS     AC Tests Right (+/-) Left (+/-) IRLS     Active Compression   Labral Tests Right (+/-) Left (+/-)   Crossbody Adduction  - Biceps Load Test II     AC Resisted Extension   Jerk Test     GH Instability Tests Right (+/-) Left (+/-) Gena Test     Sulcus Sign  - Biceps Tests Right (+/-) Left (+/-)   Apprehension   Speed     Relocation   Dayami zuniga     Other:   Other:     Other:   Other:       Palpation:  Quad, glut max/med, piriformis, hamstring, pec minor, deltoid tender with restriction    Flexibility:  pec minor, hip flexor, quad limitation bilateral.    MFR layers 1-3 left lumbar paraspinals, glut max/med, piriformis, quad and hamstrings      Treatment Today      TREATMENT  MINUTES COMMENTS   Evaluation 30    Self-care/ Home management     Manual therapy 15 See above.   Neuromuscular Re-education     Therapeutic Activity     Therapeutic Exercises 12 See exercise flow-sheet for details.    Gait training     Modality__________________                Total 57    Blank areas are intentional and mean the treatment did not include these items.     PT Evaluation Code: (Please list factors)  Patient History/Comorbidities: none  Examination: see above  Clinical Presentation:  stable  Clinical Decision Making: low.    Patient History/  Comorbidities Examination  (body structures and functions, activity limitations, and/or participation restrictions) Clinical Presentation Clinical Decision Making (Complexity)   No documented Comorbidities or personal factors 1-2 Elements Stable and/or uncomplicated Low   1-2 documented comorbidities or personal factor 3 Elements Evolving clinical presentation with changing characteristics Moderate   3-4 documented comorbidities or personal factors 4 or more Unstable and unpredictable High               Bear Ma, PT  7/10/2017  10:04 AM

## 2021-06-11 NOTE — PROGRESS NOTES
Assessment:        1. Acute left-sided low back pain without sciatica    - Ambulatory referral to Physical Therapy,    2. Acute pain of left shoulder    - Ambulatory referral to Physical Therapy    3. Elevated Blood Pressure    -continue to work on weight loss and exercise as able. Will have BP re-assessed at next follow up appointment           Plan:     Visual aid of several stretching exercises given to patient to do at home. Patient instructed to david exercises that he found relieved the pain and note which ones made it worse.   For acute pain, rest, intermittent application of cold packs (later, may switch to heat, but do not sleep on heating pad), analgesics are recommended. Discussed longer term treatment plan of prn NSAID's. Proper lifting with avoidance of heavy lifting discussed. Consider XRay studies if not improving. Call or return to clinic prn if these symptoms worsen or fail to improve as anticipated. Physical Therapy referral completed.    Patient instructed to make an appointment for his March annual physical. Will address BP again at that time to see if it has normalized. Discussed colorectal screening recommendations with patient. He will schedule this after he finds employment and obtains insurance. He wants to focus on figuring out his M/S pain and getting BP under control for now.    40 minutes spent together with the patient today, more than 50% spent in counseling, discussing the above topics.        Subjective:      García Trevizo is a 51 y.o. male who presents with left shoulder pain. The symptoms began 8 months ago. Aggravating factors: no known event. Pain is located subdeltoid region. Discomfort is described as aching and sharp/stabbing. Symptoms are exacerbated by repetitive movements and overhead movements. Also occurs when he raises his arms straight up in front of him. Evaluation to date: none. Therapy to date includes: avoidance of offending activity. He also complains of  "low back pain that initially started 25 year(s) ago. 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 For the last 8 years, the pain has been manageable up until now.The pain is positional with bending or lifting, without radiation down the legs. Mechanism of injury: unknown, no history of accidents or trauma. He was told in his 20's that he had some sort of herniated disc but could not recall the specific details of his findings. Symptoms have been gradual to now constant. Prior history of back problems: recurrent self limited episodes of low back pain in the past and previous herniated disc. There is no numbness in the legs. He did have radiating pain down the back of his left leg many years ago, but, has not returned since. 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 He is now trying to decide what he wants to do. He is currently carrying [employer] coverage. On Monday, July 3rd 2017, it will switch to [employer] coverage. He has been dealing with many stressors related to his job and his father recently passed away 2 weeks ago from a heart attack. The patient does have a history of elevated blood pressure but it has not been formally diagnosed. He states that in the past he was able to control it as long as he stayed at a healthy weight. He is up about 17 pounds from where he typically is (170). He also has a history of right elbow tendonitis which he used to where a brace for. At one point, the pain was so bad at his previous job that he was using a mouse for each hand while he was on his computer. He did end up leaving that job due to several stressors and the pain. He has not needed to wear his brace in a very long time and he denies any recent pain in the left elbow.         Review of Systems  Musculoskeletal:positive for back pain and stiff joints and left shoulder pain. Denies fevers, chills, swelling, numbness or tingling.         Objective:         /80 (Patient Site: Left Arm)  Pulse 70  Ht 5' 10.75\" (1.797 m)  Wt " 182 lb (82.6 kg)  BMI 25.56 kg/m2     Physical Exam:  General Appearance: Alert, cooperative, no distress, appears stated age  Head: Normocephalic, without obvious abnormality, atraumatic  Neck: Supple, symmetrical, trachea midline, no adenopathy;  thyroid: not enlarged, symmetric, no tenderness/mass/nodules; no carotid bruit or JVD  Back: Symmetric, no curvature, ROM normal, no CVA tenderness  Lungs: Clear to auscultation bilaterally, respirations unlabored  Heart: Regular rate and rhythm, S1 and S2 normal, no murmur, rub, or gallop  Abdomen: Soft, non-tender, bowel sounds active all four quadrants,  no masses, no organomegaly  Musculoskeletal: Patient appears to be in moderate pain. Lumbosacral spine area reveals  local tenderness on left side with palpation. No mass. Painful and reduced LS ROM noted. Straight leg raise is positive at 45 degrees with right leg (pain felt in lower left back). Limited ROM with spinal extension, bilateral spinal rotation and right lateral side bend. LEFT SHOULDER: Increased pain with pronation, abduction, adduction, and internal rotation. positive Ureña test  Skin: Skin color, texture, turgor normal, no rashes or lesions  Lymph nodes: Cervical, supraclavicular, and axillary nodes normal  Neurologic: DTR's, motor strength and sensation normal, pain increased with toe to heel gait. Peripheral pulses are palpable.      Jovanna Whitaker NP

## 2021-06-12 NOTE — PROGRESS NOTES
[facility] Daily Progress     Patient Name: García Trevizo  Date: 2017  Visit #: 3/12  [employer] Authorization date 10/8/17  Referral Diagnosis:  Acute left sided low back pain without sciatica, left shoulder pain.  Referring provider: Shira Ellington MD  Visit Diagnosis:     ICD-10-CM    1. Shoulder impingement, left M75.42    2. Chronic bilateral low back pain without sciatica M54.5     G89.29    3. Decreased strength M62.81          Assessment:     Response to Intervention:  Patient reports significant improvement in back pain post treatment.    Symptoms are consistent with:    Patient is appropriate to continue with skilled physical therapy intervention, as indicated by initial plan of care.    Goal Status:  Pt. will demonstrate/verbalize independence in self-management of condition in : 4 weeks  Pt. will be independent with home exercise program in : 4 weeks  Pt. will have improved quality of sleep: with less pain;waking less times/night;in 4 weeks  Pt. will be able to walk : 20 minutes;with no pain;for exercise/recreation;in 4 weeks  Patient will stand : 30 minutes;with less pain;with less difficultty;for home chores;in 4 weeks  Patient will sit: 30 minutes;for eating;with no pain;in 4 weeks  Patient will reach / maintain arm movement: forward;overhead;with no pain;in 4 weeks  No Data Recorded  Other functional progress:          Plan / Patient Education:     Continue with initial plan of care.  Progress with home program as tolerated.  Scapular stabilization exercises, progress core, leg pull trial.    Subjective:     Pain Ratin    Back seems about the same.    Shoulder seems a little better.    Fishing off the dock.  After a while the low back cramped up.      Objective:       Significantly limited rectus femoris and hip flexor    Manual Therapy    MFR layers 1-3 lumbar paraspinals, left glut max, left quad.       Manual therapy:  bilateral hip anterior mobilization for hip  "extension    Rate/grade Target  Direction  Relative movement Location in range Patient position   2,3 Femoral head, with hands on proximal posterior thigh and distal anterior thigh. Anterior    Anterior glide of femoral head on acetabulem. Varying degrees of hip extension from mid-range to end-range. Prone          Manual therapy:  Left/right hip distraction mobilization    Rate/grade Target  Direction  Relative movement Location in range Patient position   2,3 Hip/femoral head with hands above ankle. Inferior   Inferior distraction of femoral head from acetabulem. Hip ext. Prone          Manual therapy:  Bilateral LE longitudinal lumbar mobilization.    Rate/grade Target  Direction  Relative movement Location in range Patient position   2 Hands around tibia and fibula of lower legs, superior to ankles Inferior force  Inferior distraction of Lumbar facet joints 20-30 degrees of hip flexion. Supine           Exercises below represent all exercise the patient has done in the clinic and HEP.  Please see today's exercise flow-sheet for specifics of today's exercise performance.     Exercises:  Exercise #1: manual stretching quad, hip flexor, piriformis  Comment #1: 30\" x 2 bilateral  Exercise #2: quad, hamstring, hip flexor, and piriformis stretch   Comment #2:  30\" bilateral each  Exercise #3: pec stretch-cues for keeping weight on feet and not leaning, reduced intiensity for stretch.  Comment #3: 30\" x 2   Exercise #4: scapular retraction  Comment #4: 5  Exercise #5: sleeper stretch cues for arm angle and intensity.  Comment #5: 30\" x 2   Exercise #6: pelvic tilt held secondary to pain  Comment #6:    Exercise #7: bridging low amplitude- held secondary to pain  Comment #7:    Exercise #8: partial curl-up  Comment #8: x 10       Treatment Today    TREATMENT MINUTES COMMENTS   Evaluation     Self-care/ Home management     Manual therapy 35 See above.   Neuromuscular Re-education     Therapeutic Activity     Therapeutic " Exercises 20 See exercise flow-sheet for details.    Gait training     Modality__________________                Total 55    Blank areas are intentional and mean the treatment did not include these items.       Bear Ma, PT  7/20/2017

## 2021-06-12 NOTE — PROGRESS NOTES
[facility] Daily Progress     Patient Name: García Trevizo  Date: 8/3/2017  Visit #:   [employer] Authorization date 10/8/17  Referral Diagnosis:  Acute left sided low back pain without sciatica, left shoulder pain.  Referring provider: Shira Ellington MD  Visit Diagnosis:     ICD-10-CM    1. Shoulder impingement, left M75.42    2. Decreased strength M62.81          Assessment:     Patient seen for 5 visits addressing left sided lower back pain with minimal change.  He gets some temporary relief after coming to physical therapy, but overall his activity is still limited by much the same pain.  His Modified Oswestry score got worse from 46% at evaluation to 50% today.  [facility] pain.      Response to Intervention: Good tissue response to manual therapy at shoulder.  Decreased pain and increased AROM at shoulder..  Symptoms are consistent with:    Patient is appropriate to continue with skilled physical therapy intervention, as indicated by initial plan of care.    Goal Status:  Pt. will demonstrate/verbalize independence in self-management of condition in : 4 weeks  Pt. will be independent with home exercise program in : 4 weeks  Pt. will have improved quality of sleep: with less pain;waking less times/night;in 4 weeks  Pt. will be able to walk : 20 minutes;with no pain;for exercise/recreation;in 4 weeks  Patient will stand : 30 minutes;with less pain;with less difficultty;for home chores;in 4 weeks  Patient will sit: 30 minutes;for eating;with no pain;in 4 weeks  Patient will reach / maintain arm movement: forward;overhead;with no pain;in 4 weeks  No Data Recorded  Other functional progress:          Plan / Patient Education:     Continue with initial plan of care.  Progress with home program as tolerated.  Scapular stabilization exercises, progress core, leg pull trial.    Subjective:     Pain Ratin- 5  Rode kennake and  "that irritated the shoulder a little.  Patient to seen specialist at [facility] this afternoon.    Objective:       Very significantly limited rectus femoris and moderately hip flexor    Treatment of lower back held today.  Patient to return to referring provider or consult with spine center considering lack of progress.    Manual Therapy    MFR layers 1-3 left: Upper trap, pec minor/major, deltoid, infraspinatus, teres minor, Bilateral suboccipitals, cervical extensors, scalenes.       Manual therapy:  left shoulder inferior mobilization    Rate/grade Target  Direction  Relative movement Location in range Patient position   2,3 Humeral head Inferior  Inferior glide of humeral head on glenoid. Varying degrees of shoulder abduction from neutral to end-range. Supine       Manual therapy:  Left  shoulder posterior mobilization    Rate/grade Target  Direction  Relative movement Location in range Patient position   2,3 Humeral head Posterior  Posterior glide of humeral head on glenoid. 90 degrees of shoulder abduction and in varying degrees of IR from neutral to end-range. Supine                           Exercises below represent all exercise the patient has done in the clinic and HEP.  Please see today's exercise flow-sheet for specifics of today's exercise performance.     Exercises:  Exercise #1: manual stretching quad, hip flexor, piriformis  Comment #1: 30\" x 2 bilateral  Exercise #2: quad, hamstring, hip flexor, and piriformis stretch   Comment #2:  30\" bilateral each  Exercise #3: pec stretch-cues for keeping weight on feet and not leaning, reduced intiensity for stretch.  Comment #3: 30\" x 2   Exercise #4: scapular retraction  Comment #4: 5  Exercise #5: sleeper stretch cues for arm angle and intensity.  Comment #5: 30\" x 2   Exercise #6: pelvic tilt held secondary to pain  Comment #6:    Exercise #7: bridging low amplitude- held secondary to pain  Comment #7:    Exercise #8: partial curl-up  Comment #8: x " "10  Exercise #9: double knee to the chest  Comment #9: 30\" x 3  Exercise #10: LTR  Comment #10: 10 x 3        Treatment Today    TREATMENT MINUTES COMMENTS   Evaluation     Self-care/ Home management     Manual therapy 55 See above.   Neuromuscular Re-education     Therapeutic Activity     Therapeutic Exercises     Gait training     Modality__________________                Total 55    Blank areas are intentional and mean the treatment did not include these items.       Bear Ma, PT  8/3/2017     "

## 2021-06-12 NOTE — PROGRESS NOTES
F/U  --To review MRI lumbar spine 8/8/17   --Today C/O midline low back pain, no change  --Rates back pain 6/10  --Also C/O ongoing left shoulder pain worsening x 1 year. Worse with movements otherwise pain is tolerable when not doing anything.   --PT x 7 sessions [facility] for back and left shoulder   --Hx of lumbar pain since age 21 year old     Medication  --Naproxen 500 mg 1 tab TID PRN, minimal relief  --Gabapentin 100 mg 1 cap QHS, higher dose will make him too drowsy per pt

## 2021-06-12 NOTE — PROGRESS NOTES
[facility] Daily Progress     Patient Name: García Trevizo  Date: 2017  Visit #:   [employer] Authorization date 10/8/17  Referral Diagnosis:  Acute left sided low back pain without sciatica, left shoulder pain.  Referring provider: Shira Ellington MD  Visit Diagnosis:     ICD-10-CM    1. Shoulder impingement, left M75.42    2. Chronic bilateral low back pain without sciatica M54.5     G89.29    3. Decreased strength M62.81          Assessment:     Response to Intervention:  Tolerated well.  Good tissue changes in left quad, but still restricted.  Symptoms are consistent with:    Patient is appropriate to continue with skilled physical therapy intervention, as indicated by initial plan of care.    Goal Status:  Pt. will demonstrate/verbalize independence in self-management of condition in : 4 weeks  Pt. will be independent with home exercise program in : 4 weeks  Pt. will have improved quality of sleep: with less pain;waking less times/night;in 4 weeks  Pt. will be able to walk : 20 minutes;with no pain;for exercise/recreation;in 4 weeks  Patient will stand : 30 minutes;with less pain;with less difficultty;for home chores;in 4 weeks  Patient will sit: 30 minutes;for eating;with no pain;in 4 weeks  Patient will reach / maintain arm movement: forward;overhead;with no pain;in 4 weeks  No Data Recorded  Other functional progress:          Plan / Patient Education:     Continue with initial plan of care.  Progress with home program as tolerated.  Scapular stabilization exercises, progress core, leg pull trial.    Subjective:     Pain Ratin- 5    Back still sore in SI.    Shoulder has some burning pain with reaching above shoulder.      Objective:       Very significantly limited rectus femoris and moderately hip flexor    Manual Therapy    MFR layers 1-3 left:  Quad, glut max, piriformis, pec minor, pec, major, deltoid, biceps, lat dorsi.    Manual therapy:  Right rotational lumbar  "mobilization    Rate/grade Target  Direction  Relative movement Location in range Patient position   2 Posterior femur/pelvis Along the longitudinal axis of the femur   L5 rotating right on L4 With 45 degrees bilateral hip flexion and 90 degrees knee flexion Right side-lying.            Manual therapy:  Bilateral LE longitudinal lumbar mobilization.    Rate/grade Target  Direction  Relative movement Location in range Patient position   2 Hands around tibia and fibula of lower legs, superior to ankles Inferior force  Inferior distraction of Lumbar facet joints 20-30 degrees of hip flexion. Supine         Manual therapy:  left shoulder inferior mobilization    Rate/grade Target  Direction  Relative movement Location in range Patient position   2,3 Humeral head Inferior  Inferior glide of humeral head on glenoid. Varying degrees of shoulder abduction from neutral to end-range. Supine       Manual therapy:  Left  shoulder posterior mobilization    Rate/grade Target  Direction  Relative movement Location in range Patient position   2,3 Humeral head Posterior  Posterior glide of humeral head on glenoid. 90 degrees of shoulder abduction and in varying degrees of IR from neutral to end-range. Supine                           Exercises below represent all exercise the patient has done in the clinic and HEP.  Please see today's exercise flow-sheet for specifics of today's exercise performance.     Exercises:  Exercise #1: manual stretching quad, hip flexor, piriformis  Comment #1: 30\" x 2 bilateral  Exercise #2: quad, hamstring, hip flexor, and piriformis stretch   Comment #2:  30\" bilateral each  Exercise #3: pec stretch-cues for keeping weight on feet and not leaning, reduced intiensity for stretch.  Comment #3: 30\" x 2   Exercise #4: scapular retraction  Comment #4: 5  Exercise #5: sleeper stretch cues for arm angle and intensity.  Comment #5: 30\" x 2   Exercise #6: pelvic tilt held secondary to pain  Comment #6:  " "  Exercise #7: bridging low amplitude- held secondary to pain  Comment #7:    Exercise #8: partial curl-up  Comment #8: x 10  Exercise #9: double knee to the chest  Comment #9: 30\" x 3  Exercise #10: LTR  Comment #10: 10 x 3        Treatment Today    TREATMENT MINUTES COMMENTS   Evaluation     Self-care/ Home management     Manual therapy 45 See above.   Neuromuscular Re-education     Therapeutic Activity     Therapeutic Exercises 10 See exercise flow-sheet for details.    Gait training     Modality__________________                Total 55    Blank areas are intentional and mean the treatment did not include these items.       Bear Ma, PT  7/27/2017     "

## 2021-06-12 NOTE — PROGRESS NOTES
[facility] Daily Progress     Patient Name: García Trevizo  Date: 9/8/2017  Visit #: 12/12  are Authorization date 10/8/17  Referral Diagnosis:  Acute left sided low back pain without sciatica, left shoulder pain.  Referring provider: Caesar Lewis DO  Visit Diagnosis:     ICD-10-CM    1. Shoulder impingement, left M75.42    2. Decreased strength M62.81    3. Chronic bilateral low back pain without sciatica M54.5     G89.29          Assessment:     Patient returns to PT with focus on strengthening with MEDX. He was tested and was weak for his demographic with this. He would like to hold on injection and trial PT before an injection. His low back is responding well overall the MEDX. His shoulder pain continues to remain the same and discussed with patient referral to orthopedics for shoulder if not improving after 2 more weeks of strengthening.     Due to change in treatment plan with initiation of MEDX over the last 4 visits, [facility] will be contacted to request additional visits for patient to continue with MEDX and a focus on strengthening vs MT.     Goal Status:  Pt. will demonstrate/verbalize independence in self-management of condition in : 4 weeks  Pt. will be independent with home exercise program in : 4 weeks  Pt. will have improved quality of sleep: with less pain;waking less times/night;in 4 weeks  Pt. will be able to walk : 20 minutes;with no pain;for exercise/recreation;in 4 weeks  Patient will stand : 30 minutes;with less pain;with less difficultty;for home chores;in 4 weeks  Patient will sit: 30 minutes;for eating;with no pain;in 4 weeks  Patient will reach / maintain arm movement: forward;overhead;with no pain;in 4 weeks  No Data Recorded    Plan / Patient Education:     Continue with initial plan of care.  Progress with home program as tolerated.   Scapular stabilization exercises, progress core, leg pull trial.  MEDX continue.  Continue another 2 weeks with shoulder- refer to  "orthopedics if not improved.    Subjective:     Pain Ratin-5     Thursday was pretty good. Last night did some lawn work and had more of a dull ache in the back following that. Arms about the same.     Objective:     ER limited to 60 degrees  Trial of cross friction massage to left biceps tendon.     MED X Testing Lumbar Initial 17 4 week re-test -   AROM (full ROM 0-72) 0-48    Max Torque  185#    Avg Torque  143#    Flex/ext Ratio (ideal 1.4:1) 2.13:1        Exercises below represent all exercise the patient has done in the clinic and HEP.  Please see today's exercise flow-sheet for specifics of today's exercise performance.     Exercises:  Exercise #1: manual stretching quad, hip flexor, piriformis  Comment #1: 30\" x 2 bilateral  Exercise #2: quad, hamstring, hip flexor, and piriformis stretch   Comment #2:  30\" bilateral each  Exercise #3: pec stretch-cues for keeping weight on feet and not leaning, reduced intiensity for stretch.  Comment #3: 30\" x 2   Exercise #4: scapular retraction  Comment #4: 5  Exercise #5: sleeper stretch cues for arm angle and intensity.  Comment #5: 30\" x 2   Exercise #6: pelvic tilt held secondary to pain  Comment #6:    Exercise #7: bridging low amplitude- held secondary to pain  Comment #7:    Exercise #8: partial curl-up  Comment #8: x 10  Exercise #9: double knee to the chest  Comment #9: 30\" x 3  Exercise #10: LTR  Comment #10: 10 x 3   Exercise #11: Rotary Torso 90 seconds 36#'s  Comment #11: Started to the L  Exercise #12: Doorway ER  Comment #12: X 10   Exercise #13: Serratus wall slide  Comment #13: X 10 orange  Exercise #14: D1 strengthening  Comment #14: X 10 yellow       Treatment Today    TREATMENT MINUTES COMMENTS   Evaluation     Self-care/ Home management     Manual therapy 10 Inferior and posterior GH joint mobilizations grade III and IV. Cross friction massage to biceps tendon.   Neuromuscular Re-education     Therapeutic Activity     Therapeutic Exercises " 17 MEDX rotary torso and HEP   Gait training     Modality__________________                Total 27    Blank areas are intentional and mean the treatment did not include these items.       Samuel DIAS, PT  9/8/2017

## 2021-06-12 NOTE — PROGRESS NOTES
New patient evaluation   --Hx of ongoing low back pain since at 21 year old, on and off over the years  --C/O left low back pain progressively worsening  --Lifting or sitting too long will aggravate his back pain  --Now having trouble sleeping due to pain  --Rates back pain 3/10 now, could get to 6-8/10 per pt  --Also C/O left shoulder pain, worse with certain movement  --Rates shoulder pain 2-3/10  --PT x 7 sessions [facility] for back and left shoulder   --PT helps with left shoulder but no relief for low back per pt  --No hx of back surgery, injection, images  --MRI lumbar spine in the 1989, nothing recent L4? (buldge disc)     Medication  --Advil 200 mg daily PRN OTC

## 2021-06-12 NOTE — PROGRESS NOTES
[facility] Daily Progress     Patient Name: García Trevizo  Date: 8/21/2017  Visit #: 7/12  [employer] Authorization date 10/8/17  Referral Diagnosis:  Acute left sided low back pain without sciatica, left shoulder pain.  Referring provider: Shira Ellington MD  Visit Diagnosis:     ICD-10-CM    1. Shoulder impingement, left M75.42    2. Decreased strength M62.81    3. Chronic bilateral low back pain without sciatica M54.5     G89.29          Assessment:     Patient returns to PT with focus on strengthening with MEDX. Will obtain order and initiate testing next session. The patient was educated on facet arthropathy today. He would like to hold on injection and trial PT before an injection. The patient may benefit from skilled PT to improve strength with MEDX, but due to pain with movement, if not tolerating MEDX well will be sent back to referring provider.       Response to Intervention: Good tissue response to manual therapy at shoulder.  Decreased pain and increased AROM at shoulder..  Patient is appropriate to continue with skilled physical therapy intervention, as indicated by initial plan of care.    Goal Status:  Pt. will demonstrate/verbalize independence in self-management of condition in : 4 weeks  Pt. will be independent with home exercise program in : 4 weeks  Pt. will have improved quality of sleep: with less pain;waking less times/night;in 4 weeks  Pt. will be able to walk : 20 minutes;with no pain;for exercise/recreation;in 4 weeks  Patient will stand : 30 minutes;with less pain;with less difficultty;for home chores;in 4 weeks  Patient will sit: 30 minutes;for eating;with no pain;in 4 weeks  Patient will reach / maintain arm movement: forward;overhead;with no pain;in 4 weeks  No Data Recorded    Plan / Patient Education:     Continue with initial plan of care.  Progress with home program as tolerated.   Scapular stabilization exercises, progress core, leg pull trial.  MEDX as able.  "    Subjective:     Pain Ratin-5     The shoulder  Does continue to bother him some- reaches up and out to catch himself and irritated the shoulder again.  He had stopped doing any back stuff because it was painful. He is wanting to do PT prior to doing an injection.    Objective:     Educated on facet arthropathy.  Resumption of light lumbar stretching with PPT and Ella pose.  Decreased pain with SL lumbar rotational mobilizations.       Exercises below represent all exercise the patient has done in the clinic and HEP.  Please see today's exercise flow-sheet for specifics of today's exercise performance.     Exercises:  Exercise #1: manual stretching quad, hip flexor, piriformis  Comment #1: 30\" x 2 bilateral  Exercise #2: quad, hamstring, hip flexor, and piriformis stretch   Comment #2:  30\" bilateral each  Exercise #3: pec stretch-cues for keeping weight on feet and not leaning, reduced intiensity for stretch.  Comment #3: 30\" x 2   Exercise #4: scapular retraction  Comment #4: 5  Exercise #5: sleeper stretch cues for arm angle and intensity.  Comment #5: 30\" x 2   Exercise #6: pelvic tilt held secondary to pain  Comment #6:    Exercise #7: bridging low amplitude- held secondary to pain  Comment #7:    Exercise #8: partial curl-up  Comment #8: x 10  Exercise #9: double knee to the chest  Comment #9: 30\" x 3  Exercise #10: LTR  Comment #10: 10 x 3        Treatment Today    TREATMENT MINUTES COMMENTS   Evaluation     Self-care/ Home management     Manual therapy 15 R SL lumbar rotational mobilizations grade III, BLE distraction 30 seconds X 4   Neuromuscular Re-education     Therapeutic Activity     Therapeutic Exercises 15 Resumption of lumbar stretching.   Gait training     Modality__________________                Total 30    Blank areas are intentional and mean the treatment did not include these items.       Samuel DIAS, PT  2017     "

## 2021-06-12 NOTE — PROGRESS NOTES
[facility] Daily Progress     Patient Name: García Trevizo  Date: 9/6/2017  Visit #: 11/12  are Authorization date 10/8/17  Referral Diagnosis:  Acute left sided low back pain without sciatica, left shoulder pain.  Referring provider: Caesar Lewis DO  Visit Diagnosis:     ICD-10-CM    1. Shoulder impingement, left M75.42    2. Decreased strength M62.81    3. Chronic bilateral low back pain without sciatica M54.5     G89.29          Assessment:     Patient returns to PT with focus on strengthening with MEDX. He was tested and was weak for his demographic with this. He would like to hold on injection and trial PT before an injection. His low back is responding well overall the MEDX. His shoulder pain continues to remain the same and discussed with patient referral to orthopedics for shoulder if not improving after 2 more weeks of strengthening.     Due to change in treatment plan with initiation of MEDX over the last 3 visits, [facility] will be contacted to request additional visits for patient to continue with MEDX and a focus on strengthening vs MT.     Goal Status:  Pt. will demonstrate/verbalize independence in self-management of condition in : 4 weeks  Pt. will be independent with home exercise program in : 4 weeks  Pt. will have improved quality of sleep: with less pain;waking less times/night;in 4 weeks  Pt. will be able to walk : 20 minutes;with no pain;for exercise/recreation;in 4 weeks  Patient will stand : 30 minutes;with less pain;with less difficultty;for home chores;in 4 weeks  Patient will sit: 30 minutes;for eating;with no pain;in 4 weeks  Patient will reach / maintain arm movement: forward;overhead;with no pain;in 4 weeks  No Data Recorded    Plan / Patient Education:     Continue with initial plan of care.  Progress with home program as tolerated.   Scapular stabilization exercises, progress core, leg pull trial.  MEDX continue.  Continue another 2 weeks with shoulder- refer to  "orthopedics if not improved.    Subjective:     Pain Ratin-5     The back is about the same. Has a lot of old trees in his yard and has a lot of sticks to  and he was pretty tight when he came in. The stretches do help to loosen him up.     Objective:     ER limited to 60 degrees  Trial of cross friction massage to left biceps tendon.     MED X Testing Lumbar Initial 17 4 week re-test -   AROM (full ROM 0-72) 0-48    Max Torque  185#    Avg Torque  143#    Flex/ext Ratio (ideal 1.4:1) 2.13:1        Exercises below represent all exercise the patient has done in the clinic and HEP.  Please see today's exercise flow-sheet for specifics of today's exercise performance.     Exercises:  Exercise #1: manual stretching quad, hip flexor, piriformis  Comment #1: 30\" x 2 bilateral  Exercise #2: quad, hamstring, hip flexor, and piriformis stretch   Comment #2:  30\" bilateral each  Exercise #3: pec stretch-cues for keeping weight on feet and not leaning, reduced intiensity for stretch.  Comment #3: 30\" x 2   Exercise #4: scapular retraction  Comment #4: 5  Exercise #5: sleeper stretch cues for arm angle and intensity.  Comment #5: 30\" x 2   Exercise #6: pelvic tilt held secondary to pain  Comment #6:    Exercise #7: bridging low amplitude- held secondary to pain  Comment #7:    Exercise #8: partial curl-up  Comment #8: x 10  Exercise #9: double knee to the chest  Comment #9: 30\" x 3  Exercise #10: LTR  Comment #10: 10 x 3   Exercise #11: Rotary Torso 90 seconds 36#'s  Comment #11: Started to the L  Exercise #12: Doorway ER  Comment #12: X 10   Exercise #13: Serratus wall slide  Comment #13: X 10 orange  Exercise #14: D1 strengthening  Comment #14: X 10 yellow       Treatment Today    TREATMENT MINUTES COMMENTS   Evaluation     Self-care/ Home management     Manual therapy 10 Inferior and posterior GH joint mobilizations grade III and IV. Cross friction massage to biceps tendon.   Neuromuscular Re-education   "   Therapeutic Activity     Therapeutic Exercises 17 MEDX rotary torso and HEP   Gait training     Modality__________________                Total 27    Blank areas are intentional and mean the treatment did not include these items.       Samuel DIAS, PT  9/6/2017
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
## 2021-06-12 NOTE — PROGRESS NOTES
[facility] Daily Progress     Patient Name: García Trevizo  Date: 2017  Visit #:   [employer] Authorization date 10/8/17  Referral Diagnosis:  Acute left sided low back pain without sciatica, left shoulder pain.  Referring provider: Caesar Lewis DO  Visit Diagnosis:     ICD-10-CM    1. Shoulder impingement, left M75.42    2. Decreased strength M62.81    3. Chronic bilateral low back pain without sciatica M54.5     G89.29          Assessment:     Patient returns to PT with focus on strengthening with MEDX. He was tested and was weak for his demographic with this. He would like to hold on injection and trial PT before an injection. The patient may benefit from skilled PT to improve strength with MEDX, but due to pain with movement, if not tolerating MEDX well will be sent back to referring provider.     Goal Status:  Pt. will demonstrate/verbalize independence in self-management of condition in : 4 weeks  Pt. will be independent with home exercise program in : 4 weeks  Pt. will have improved quality of sleep: with less pain;waking less times/night;in 4 weeks  Pt. will be able to walk : 20 minutes;with no pain;for exercise/recreation;in 4 weeks  Patient will stand : 30 minutes;with less pain;with less difficultty;for home chores;in 4 weeks  Patient will sit: 30 minutes;for eating;with no pain;in 4 weeks  Patient will reach / maintain arm movement: forward;overhead;with no pain;in 4 weeks  No Data Recorded    Plan / Patient Education:     Continue with initial plan of care.  Progress with home program as tolerated.   Scapular stabilization exercises, progress core, leg pull trial.  MEDX as able.   Possible cross friction massage.  Check for subscap tightness.    Subjective:     Pain Ratin-5     The back stretches do seem to help alleviate some of the pain and don't bother it.     Objective:     Education on shoulder strengthening for RC with [facility].    MED X Testing Lumbar Initial 17 4 week  "re-test -   AROM (full ROM 0-72) 0-48    Max Torque  185#    Avg Torque  143#    Flex/ext Ratio (ideal 1.4:1) 2.13:1        Exercises below represent all exercise the patient has done in the clinic and HEP.  Please see today's exercise flow-sheet for specifics of today's exercise performance.     Exercises:  Exercise #1: manual stretching quad, hip flexor, piriformis  Comment #1: 30\" x 2 bilateral  Exercise #2: quad, hamstring, hip flexor, and piriformis stretch   Comment #2:  30\" bilateral each  Exercise #3: pec stretch-cues for keeping weight on feet and not leaning, reduced intiensity for stretch.  Comment #3: 30\" x 2   Exercise #4: scapular retraction  Comment #4: 5  Exercise #5: sleeper stretch cues for arm angle and intensity.  Comment #5: 30\" x 2   Exercise #6: pelvic tilt held secondary to pain  Comment #6:    Exercise #7: bridging low amplitude- held secondary to pain  Comment #7:    Exercise #8: partial curl-up  Comment #8: x 10  Exercise #9: double knee to the chest  Comment #9: 30\" x 3  Exercise #10: LTR  Comment #10: 10 x 3        Treatment Today    TREATMENT MINUTES COMMENTS   Evaluation     Self-care/ Home management     Manual therapy 10 Inferior and posterior GH joint mobilizations grade III and IV. Posterior shoulder STM.   Neuromuscular Re-education     Therapeutic Activity     Therapeutic Exercises 17 MEDX rotary torso and HEP   Gait training     Modality__________________                Total 27    Blank areas are intentional and mean the treatment did not include these items.       Samuel DIAS, PT  8/28/2017     "

## 2021-06-12 NOTE — PROGRESS NOTES
ASSESSMENT: García Trevizo is a 51 y.o. male who presents for consultation at the request of [facility] PCP Shira Ellington MD, with a past medical history significant for history of lumbar disc herniation at age 21, anxiety who presents today for new patient evaluation of chronic left low back pain without radicular type symptoms that has been progressive.  Patient is neurologically intact on exam, no worrisome symptoms however he does have increased pain with facet loading on the left indicating facet mediated type pain.    He also endorses left shoulder pain consistent with shoulder impingement.    ANGEL LUIS Score: 42    WHO 5: 9     PHQ-2: 2    Diagnoses and all orders for this visit:    Chronic left-sided low back pain without sciatica  -     MR Lumbar Spine Without Contrast; Future; Expected date: 8/3/17  -     naproxen (EC NAPROSYN) 500 MG EC tablet; Take 1 tablet (500 mg total) by mouth 3 (three) times a day as needed.  Dispense: 28 tablet; Refill: 1  -     gabapentin (NEURONTIN) 100 MG capsule; Take 100-300 mg by mouth at bedtime. Follow Gabapentin Dosing chart given  Dispense: 30 capsule; Refill: 3    Shoulder impingement, left    Sleep difficulties      PLAN:  Reviewed spine anatomy and disease process. Discussed diagnosis and treatment options with the patient today. A shared decision making model was used.  The patient's values and choices were respected. The following represents what was discussed and decided upon by the provider and the patient.      -DIAGNOSTIC TESTS: Ordered lumbar spine MRI today to further evaluate left low back pain.    -PHYSICAL THERAPY: Encouraged patient to continue home exercise program, can reevaluate PT pending MRI review.  Discussed the importance of core strengthening, ROM, stretching exercises with the patient and how each of these entities is important in decreasing pain.  Explained to the patient that the purpose of physical therapy is to teach the patient a home exercise program.   These exercises need to be performed every day in order to decrease pain and prevent future occurrences of pain.        -MEDICATIONS: Prescribed gabapentin 100 mg, 1-3 tablets at nighttime as tolerated for sleep/pain.  -Also prescribed naproxen 500 mg 1 tablet 3 times daily as needed for pain. Reviewed risks of NSAIDs, including GI irritation, kidney dysfunction, and CV effects.  Discussed side effects of medications and proper use. Patient verbalized understanding.    -PATIENT EDUCATION:  35 minutes of total visit time was spent face to face with the patient today, 60 % of the visit was spent on counseling, education, and coordinating care.   -10 minutes spent outside of visit time, non-face-to-face time, reviewing chart.    -FOLLOW-UP:   Follow-up after obtaining lumbar spine MRI to review images and ongoing plan.    Advised pt to call the [facility] if symptoms worsen or you have problems controlling bladder and bowel function.   ______________________________________________________________________    SUBJECTIVE:  HPI:  García Trevizo  Is a 51 y.o. male who presents today for new patient evaluation of low back pain on the left lumbosacral junction that is chronic in nature since he was 21 years old however has been progressively worsening.  Currently his pain is a 3/10 but it does get up to an 8/10 at its worst typically with standing and sitting for long periods of time, occasionally with movement he feels a catching feeling in the left low back, denies any numbness or tingling or radicular symptoms however.  Does report that lying is his most comfortable position.  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    He does also endorse ongoing left shoulder pain with range of motion, denies any trauma,  denies numbness or tingling upper extremity, denies neck pain.    Treatment to Date: Physical therapy ×7 [facility] 8/3/2017 with some relief of left shoulder pain, short-term relief with pain only.    Medications:  Advil with minimal relief.    Current Outpatient Prescriptions on File Prior to Encounter   Medication Sig Dispense Refill     BRIAN, ZINGIBER OFFICINALIS, ORAL Take by mouth.       MULTIVIT-MINERALS/FERROUS FUM (MULTI VITAMIN ORAL) Take by mouth.       OMEGA-3/DHA/EPA/FISH OIL (FISH OIL-OMEGA-3 FATTY ACIDS) 300-1,000 mg capsule Take 2 g by mouth daily.       No current facility-administered medications on file prior to encounter.        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  Patient typically walks bikes and weight lifts on a regular basis.  Patient denies smoking history, does drink alcohol 4-8 drinks per week, denies being a heavy drinker, denies recreational drug use.    ROS: Positive for anxiety, poor sleep quality, back pain, joint pain, worry, headache.  Specifically negative for bowel/bladder dysfunction, balance changes, dizziness, foot drop, fevers, chills, appetite changes, nausea/vomiting, unexplained weight loss. Otherwise 13 systems reviewed are negative. Please see the patient's intake questionnaire from today for  details.    OBJECTIVE:  PHYSICAL EXAMINATION:    --CONSTITUTIONAL:  Vital signs as above.  No acute distress.  The patient is well nourished and well groomed.  --PSYCHIATRIC:  Appropriate mood and affect. The patient is awake, alert, oriented to person, place, time and answering questions appropriately with clear speech.    --SKIN:  Skin over the face, bilateral lower extremities, and posterior torso is clean, dry, intact without rashes.    --RESPIRATORY: Normal rhythm and effort. No abnormal accessory muscle breathing patterns noted.   --ABDOMINAL:  Soft, non-distended, non-tender throughout all quadrants.  No pulsatile mass palpated in the left lower quadrant.  --STANDING EXAMINATION:  Normal lumbar lordosis noted, no lateral shift.  --MUSCULOSKELETAL: Lumbar spine inspection reveals no evidence of deformity. Range of motion is not limited in lumbar flexion, extension, however he does have significant increased pain with lumbar extension and lateral rotation simultaneously left greater than right.  Minimal tenderness palpation left L4-5 region. Straight leg raising in the seated position is negative to radicular pain, however positive to back pain bilaterally left greater than right. Sciatic notch non-tender.  --GROSS MOTOR: Gait is non-antalgic. Easily arises from a seated position. Toe walking and heel walking are normal without significant difficulty.    --LOWER EXTREMITY MOTOR TESTING:  Plantar flexion left 5/5, right 5/5   Dorsiflexion left 5/5, right 5/5   Great toe MTP extension left 5/5, right 5/5  Knee flexion left 5/5, right 5/5  Knee extension left 5/5, right 5/5   Hip flexion left 5/5, right 5/5  Hip abduction left 5/5, right 5/5  Hip adduction left 5/5, right 5/5   --HIPS: Full range of motion bilaterally. Negative FABERs on the involved lower extremity.   --NEUROLOGICAL:  2/4 patellar, medial hamstring, and achilles reflexes bilaterally.  Sensation to light touch is intact in the bilateral L4, L5,  and S1 dermatomes. Babinski is negative. No clonus.  --VASCULAR:  2/4 dorsalis pedis and posterior tibialsi pulses bilaterally.  Bilateral lower extremities are warm.  There is no pitting edema of the bilateral lower extremities.    RESULTS: Prior medical records from 6/29/2017 current were reviewed today.    Imaging: No results found.

## 2021-06-12 NOTE — PROGRESS NOTES
Left voicemail for patient informing him that referral has been sent for [facility] team for further workup based on documentation from physical therapy.

## 2021-06-12 NOTE — PROGRESS NOTES
[facility] Daily Progress     Patient Name: García Trevizo  Date: 2017  Visit #:   [employer] Authorization date 10/8/17  Referral Diagnosis:  Acute left sided low back pain without sciatica, left shoulder pain.  Referring provider: Shira Ellington MD  Visit Diagnosis:     ICD-10-CM    1. Shoulder impingement, left M75.42    2. Chronic bilateral low back pain without sciatica M54.5     G89.29    3. Decreased strength M62.81          Assessment:     Response to Intervention:  Tolerated well.  Good tissue changes in left quad, but still restricted.  Symptoms are consistent with:    Patient is appropriate to continue with skilled physical therapy intervention, as indicated by initial plan of care.    Goal Status:  Pt. will demonstrate/verbalize independence in self-management of condition in : 4 weeks  Pt. will be independent with home exercise program in : 4 weeks  Pt. will have improved quality of sleep: with less pain;waking less times/night;in 4 weeks  Pt. will be able to walk : 20 minutes;with no pain;for exercise/recreation;in 4 weeks  Patient will stand : 30 minutes;with less pain;with less difficultty;for home chores;in 4 weeks  Patient will sit: 30 minutes;for eating;with no pain;in 4 weeks  Patient will reach / maintain arm movement: forward;overhead;with no pain;in 4 weeks  No Data Recorded  Other functional progress:          Plan / Patient Education:     Continue with initial plan of care.  Progress with home program as tolerated.  Scapular stabilization exercises, progress core, leg pull trial.    Subjective:     Pain Ratin    Back about the same.    Shoulder seems 20% better.    Any lifting still increases the back pain.    Rolling in bed, trying to sleep, sets it off.      Objective:       Very significantly limited rectus femoris and moderately hip flexor    Manual Therapy    MFR layers 1-3 left:  Quad    Manual therapy:  Bilateral LE longitudinal lumbar  "mobilization.    Rate/grade Target  Direction  Relative movement Location in range Patient position   2 Hands around tibia and fibula of lower legs, superior to ankles Inferior force  Inferior distraction of Lumbar facet joints 20-30 degrees of hip flexion. Supine                             Exercises below represent all exercise the patient has done in the clinic and HEP.  Please see today's exercise flow-sheet for specifics of today's exercise performance.     Exercises:  Exercise #1: manual stretching quad, hip flexor, piriformis  Comment #1: 30\" x 2 bilateral  Exercise #2: quad, hamstring, hip flexor, and piriformis stretch   Comment #2:  30\" bilateral each  Exercise #3: pec stretch-cues for keeping weight on feet and not leaning, reduced intiensity for stretch.  Comment #3: 30\" x 2   Exercise #4: scapular retraction  Comment #4: 5  Exercise #5: sleeper stretch cues for arm angle and intensity.  Comment #5: 30\" x 2   Exercise #6: pelvic tilt held secondary to pain  Comment #6:    Exercise #7: bridging low amplitude- held secondary to pain  Comment #7:    Exercise #8: partial curl-up  Comment #8: x 10       Treatment Today    TREATMENT MINUTES COMMENTS   Evaluation     Self-care/ Home management     Manual therapy 45 See above.   Neuromuscular Re-education     Therapeutic Activity     Therapeutic Exercises 15 See exercise flow-sheet for details.    Gait training     Modality__________________                Total 60    Blank areas are intentional and mean the treatment did not include these items.       Bear Ma, PT  7/25/2017     "

## 2021-06-12 NOTE — PROGRESS NOTES
Assessment:     Diagnoses and all orders for this visit:    Chronic left-sided low back pain without sciatica  -     OPS Paravertebral Facet Joint Inj Lbr Un; Future; Expected date: 8/17/17    Lumbar facet arthropathy  -     OPS Paravertebral Facet Joint Inj Lbr Un; Future; Expected date: 8/17/17       García Trevizo is a 52 y.o. y.o. male with past medical history significant for lumbar disc herniation at age 21, anxiety who presents today for follow-up regarding ongoing chronic left-sided low back pain without radicular type symptoms that have been progressive however ongoing for 20 years.    Patient does have increased facet loading on the left indicating facet mediated pain on MRI he does have facet arthropathy left L4-5 and L5-S1.     Plan:     A shared decision making plan was used. The patient's values and choices were respected. Prior medical records from 8/3/2017 were reviewed today. The following represents what was discussed and decided upon by the provider and the patient.        -DIAGNOSTIC TESTS: Images were personally reviewed and interpreted.   --Lumbar spine MRI 8/8/2017 reveals slight degenerative retrolisthesis L5-S1 with advanced height loss.  There is also moderate facet arthropathy left greater than right at both L4-5 and L5-S1.  No significant foraminal or or central canal stenosis at any level.    -INTERVENTIONS: Ordered left L4-5 and L5-S1 facet joint steroid injection to see if we get further relief of his left-sided low back pain.  If he gets no relief with this injection would recommend a medial branch block to determine how much of his pain is facet mediated however due to the fact there is some slight fluid within the left L4-5 and L5-S1 facet joint, reasonable to try steroid first.    -MEDICATIONS: Advised patient continue naproxen 3 times daily as needed for pain as well as gabapentin at nighttime.  Discussed side effects of medications and proper use. Patient verbalized  understanding.    -PHYSICAL THERAPY: Urged patient to continue home exercise program with traditional physical therapy.  -Patient may be a candidate for lumbar MedX program as well for core strengthening.  Currently his exercises are aggravating however therefore will reevaluate after injection.  Discussed the importance of core strengthening, ROM, stretching exercises with the patient and how each of these entities is important in decreasing pain.  Explained to the patient that the purpose of physical therapy is to teach the patient a home exercise program.  These exercises need to be performed every day in order to decrease pain and prevent future occurrences of pain.        -PATIENT EDUCATION:  20 minutes of total visit time was spent face to face with the patient today, 60 % of the visit was spent on counseling, education, and coordinating care.   -5 minutes spent outside of visit time, non-face-to-face time, reviewing chart.    -FOLLOW UP: Follow-up for injection then 2 weeks postinjection.  Advised to contact clinic if symptoms worsen or change.    Subjective:     García Trevizo is a 52 y.o. male who presents today for follow-up regarding ongoing chronic low back pain on the left lumbosacral junction that is chronic in nature since he was 21 however has been progressive.  Currently his pain is a 6/10 up to an 8/10 at its worst more bothersome with bending, sitting, twisting, lifting.  Patient denies radicular pain, denies weakness, denies numbness or tingling, denies recent trips or falls or balance changes.  Has bowel or bladder dysfunction.    He does also endorse ongoing left shoulder pain with range of motion, denies any trauma, denies numbness or tingling upper extremity, denies neck pain.     Treatment to Date: Physical therapy ×7 sessions optimum last 8/3/2017 with some relief of left shoulder pain, short-term relief with pain only.     Medications:  Naproxen 500 mg with minimal relief.  Gabapentin 100  mg 1 tablet at nighttime with some relief, unable to take higher dose due to grogginess.    Patient Active Problem List   Diagnosis     Acute left-sided low back pain without sciatica     Acute pain of left shoulder       Current Outpatient Prescriptions on File Prior to Encounter   Medication Sig Dispense Refill     gabapentin (NEURONTIN) 100 MG capsule Take 100-300 mg by mouth at bedtime. Follow Gabapentin Dosing chart given 30 capsule 3     GINGER, ZINGIBER OFFICINALIS, ORAL Take by mouth.       MULTIVIT-MINERALS/FERROUS FUM (MULTI VITAMIN ORAL) Take by mouth.       naproxen (EC NAPROSYN) 500 MG EC tablet Take 1 tablet (500 mg total) by mouth 3 (three) times a day as needed. 28 tablet 1     [DISCONTINUED] OMEGA-3/DHA/EPA/FISH OIL (FISH OIL-OMEGA-3 FATTY ACIDS) 300-1,000 mg capsule Take 2 g by mouth daily.       No current facility-administered medications on file prior to encounter.        No Known Allergies    Past Medical History:   Diagnosis Date     Anxiety      Hydrocele 2008        Review of Systems  ROS:   Specifically negative for bowel/bladder dysfunction, balance changes, headache, dizziness, foot drop, fevers, chills, appetite changes, nausea/vomiting, unexplained weight loss. Otherwise 13 systems reviewed are negative. Please see the patient's intake questionnaire from today for details.    Reviewed Social, Family, Past Medical and Past Surgical history with patient, no significant changes noted since prior visit.     Objective:     /83 (Patient Site: Left Arm, Patient Position: Sitting)  Pulse (!) 51  Wt 181 lb (82.1 kg)  SpO2 99%  BMI 25.42 kg/m2    PHYSICAL EXAMINATION:    --CONSTITUTIONAL: Well developed, well nourished, healthy appearing individual.  --PSYCHIATRIC: Appropriate mood and affect. No difficulty interacting due to temper, social withdrawal, or memory issues.  --SKIN: Lumbar region is dry and intact. Sensation to light touch is intact in the bilateral L4, L5, and S1  dermatomes.  --RESPIRATORY: Normal rhythm and effort. No abnormal accessory muscle breathing patterns noted.   --MUSCULOSKELETAL:  Normal lumbar lordosis noted, no lateral shift.  --GROSS MOTOR: Easily arises from a seated position.   --LUMBAR SPINE:  Inspection reveals no evidence of deformity. Range of motion is not limited in lumbar flexion, extension, does have increased pain with lateral rotation and extension left greater than right.  Minimal tenderness palpation left L4-5 region.. Straight leg raising in the supine position is negative to radicular pain. Sciatic notch non-tender.   --LOWER EXTREMITY MOTOR TESTING:  Plantar flexion left 5/5, right 5/5   Dorsiflexion left 5/5, right 5/5   Great toe MTP extension left 5/5, right 5/5  Knee flexion left 5/5, right 5/5  Knee extension left 5/5, right 5/5   Hip flexion left 5/5, right 5/5  Hip abduction left 5/5, right 5/5  Hip adduction left 5/5, right 5/5   --HIPS: Full range of motion bilaterally. Negative FABERs on the involved lower extremity.   --NEUROLOGIC: Bilateral patellar and achilles reflexes are physiologic and symmetric. Lower extremities are intact to light touch.     RESULTS:   Imaging: MRI of the lumbar spine was reviewed today. The images were shown to the patient and the findings were explained using a spine model.    Mr Lumbar Spine Without Contrast    Result Date: 8/9/2017  [facility] MR LUMBAR SPINE WITHOUT CONTRAST 08/08/2017, 6:27 PM INDICATION: Low back pain, >6 weeks / red flag(s) / radiculopathy. TECHNIQUE: Routine. CONTRAST: None. COMPARISON: None. FINDINGS: Nomenclature is based on five lumbar type vertebral bodies. Normal vertebral body heights and marrow signal. Slight degenerative retrolisthesis L5 on S1. No pars defect. The conus tip is identified at L1. Grossly normal paraspinal soft tissues.  No abdominal aortic aneurysm. The visualized portions of the bony pelvis are normal for age. T12-L1: Normal disc height and signal.  No herniation. No facet arthropathy. No spinal canal stenosis. No right neural foraminal stenosis. No left neural foraminal stenosis. L1-L2: Normal disc height and signal. No herniation. No facet arthropathy. No spinal canal stenosis. No right neural foraminal stenosis. No left neural foraminal stenosis. L2-L3: Normal disc height and signal. No herniation. No facet arthropathy. No spinal canal stenosis. No right neural foraminal stenosis. No left neural foraminal stenosis. L3-L4: Normal disc height and signal. No herniation. Mild facet arthropathy. No spinal canal stenosis. No right neural foraminal stenosis. No left neural foraminal stenosis. L4-L5: Normal disc height and signal. Small central disc bulge or tiny protrusion. Moderate facet arthropathy. No spinal canal stenosis. No right neural foraminal stenosis. No left neural foraminal stenosis. L5-S1: Fairly advanced loss of disc height and signal. Slight disc bulging and dorsal spurring. At least moderate facet arthropathy. No spinal canal stenosis. Mild right neural foraminal stenosis. Mild left neural foraminal stenosis.     CONCLUSION: 1.  At the L5-S1 level, there is fairly advanced disc degeneration with at least moderate facet degeneration. There is slight dorsal spurring and disc bulging present. The central canal is adequate and there is mild bilateral L5-S1 foraminal narrowing. 2.  At the L4-L5 level, there is a small central disc bulge or tiny protrusion. No focal nerve root compression, central canal or neural foraminal narrowing. Moderate facet arthropathy at this level. 3.  Upper lumbar levels within normal limits.

## 2021-06-12 NOTE — PROGRESS NOTES
[facility] Daily Progress     Patient Name: García Trevizo  Date: 2017  Visit #:   [employer] Authorization date 10/8/17  Referral Diagnosis:  Acute left sided low back pain without sciatica, left shoulder pain.  Referring provider: Caesar Lewis DO  Visit Diagnosis:     ICD-10-CM    1. Shoulder impingement, left M75.42    2. Decreased strength M62.81    3. Chronic bilateral low back pain without sciatica M54.5     G89.29          Assessment:     Patient returns to PT with focus on strengthening with MEDX. He was tested and was weak for his demographic with this. He would like to hold on injection and trial PT before an injection. The patient may benefit from skilled PT to improve strength with MEDX, but due to pain with movement, if not tolerating MEDX well will be sent back to referring provider.     Goal Status:  Pt. will demonstrate/verbalize independence in self-management of condition in : 4 weeks  Pt. will be independent with home exercise program in : 4 weeks  Pt. will have improved quality of sleep: with less pain;waking less times/night;in 4 weeks  Pt. will be able to walk : 20 minutes;with no pain;for exercise/recreation;in 4 weeks  Patient will stand : 30 minutes;with less pain;with less difficultty;for home chores;in 4 weeks  Patient will sit: 30 minutes;for eating;with no pain;in 4 weeks  Patient will reach / maintain arm movement: forward;overhead;with no pain;in 4 weeks  No Data Recorded    Plan / Patient Education:     Continue with initial plan of care.  Progress with home program as tolerated.   Scapular stabilization exercises, progress core, leg pull trial.  MEDX as able.   Possible cross friction massage.    Subjective:     Pain Ratin-5     The shoulder exercise went okay, a little pain if he goes too far. Did some yardwork yesterday which usually bothers the back, but it went a little better this time. The night after the machine could feel the upper back even a little  "light ache.    Objective:     ER limited to 65 degrees  No tenderness to subscap.  Educated on shoulder strengthening.    MED X Testing Lumbar Initial 8/28/17 4 week re-test -   AROM (full ROM 0-72) 0-48    Max Torque  185#    Avg Torque  143#    Flex/ext Ratio (ideal 1.4:1) 2.13:1        Exercises below represent all exercise the patient has done in the clinic and HEP.  Please see today's exercise flow-sheet for specifics of today's exercise performance.     Exercises:  Exercise #1: manual stretching quad, hip flexor, piriformis  Comment #1: 30\" x 2 bilateral  Exercise #2: quad, hamstring, hip flexor, and piriformis stretch   Comment #2:  30\" bilateral each  Exercise #3: pec stretch-cues for keeping weight on feet and not leaning, reduced intiensity for stretch.  Comment #3: 30\" x 2   Exercise #4: scapular retraction  Comment #4: 5  Exercise #5: sleeper stretch cues for arm angle and intensity.  Comment #5: 30\" x 2   Exercise #6: pelvic tilt held secondary to pain  Comment #6:    Exercise #7: bridging low amplitude- held secondary to pain  Comment #7:    Exercise #8: partial curl-up  Comment #8: x 10  Exercise #9: double knee to the chest  Comment #9: 30\" x 3  Exercise #10: LTR  Comment #10: 10 x 3        Treatment Today    TREATMENT MINUTES COMMENTS   Evaluation     Self-care/ Home management     Manual therapy 10 Inferior and posterior GH joint mobilizations grade III and IV. Posterior shoulder STM.   Neuromuscular Re-education     Therapeutic Activity     Therapeutic Exercises 17 MEDX rotary torso and HEP   Gait training     Modality__________________                Total 27    Blank areas are intentional and mean the treatment did not include these items.       Samuel DIAS, PT  8/30/2017     "

## 2021-06-13 NOTE — PROGRESS NOTES
[facility] Daily Progress     Patient Name: García Trevizo  Date: 10/4/2017  Visit #: 12/12 3/4 from [facility].  [employer] Authorization date 10/8/17  Referral Diagnosis:  Acute left sided low back pain without sciatica, left shoulder pain.  Referring provider: Caesar Lewis DO  Visit Diagnosis:     ICD-10-CM    1. Shoulder impingement, left M75.42    2. Decreased strength R53.1    3. Chronic bilateral low back pain without sciatica M54.5     G89.29          Assessment:     Patient returns to PT with focus on strengthening with MEDX. He was tested and was weak for his demographic with this. He would like to hold on injection and trial PT before an injection. His low back is responding well overall the MEDX. He had an injection in his shoulder which seems to be responding well.    Will plan to see patient 1X/week for 1 more week due to insurance authorization.     Goal Status:  Pt. will demonstrate/verbalize independence in self-management of condition in : 4 weeks  Pt. will be independent with home exercise program in : 4 weeks  Pt. will have improved quality of sleep: with less pain;waking less times/night;in 4 weeks  Pt. will be able to walk : 20 minutes;with no pain;for exercise/recreation;in 4 weeks  Patient will stand : 30 minutes;with less pain;with less difficultty;for home chores;in 4 weeks  Patient will sit: 30 minutes;for eating;with no pain;in 4 weeks  Patient will reach / maintain arm movement: forward;overhead;with no pain;in 4 weeks  No Data Recorded    Plan / Patient Education:     Continue with initial plan of care.  Progress with home program as tolerated.   Scapular stabilization exercises, progress core, leg pull trial PRN  MEDX continue    Final visit.    Subjective:     Pain Rating:     Had his injection on Monday. He was sore from it initially, but now feeling like it is starting to help. Back is doing well with the exercises. Still gets a little catch in the back, but the stretching  "helps. Worked on a bench yesterday for a couple of hours which bothered it a little bit.     Objective:     Progressed strengthening with bridge progression today.  Discussed importance of loading shoulder to help with tendonitis- but letting shoulder rest while injection takes hold.    MED X Testing Lumbar Initial 8/28/17 4 week re-test -   AROM (full ROM 0-72) 0-48    Max Torque  185#    Avg Torque  143#    Flex/ext Ratio (ideal 1.4:1) 2.13:1        Exercises below represent all exercise the patient has done in the clinic and HEP.  Please see today's exercise flow-sheet for specifics of today's exercise performance.     Exercises:  Exercise #1: manual stretching quad, hip flexor, piriformis  Comment #1: 30\" x 2 bilateral  Exercise #2: quad, hamstring, hip flexor, and piriformis stretch   Comment #2:  30\" bilateral each  Exercise #3: pec stretch-cues for keeping weight on feet and not leaning, reduced intiensity for stretch.  Comment #3: 30\" x 2   Exercise #4: scapular retraction  Comment #4: 5  Exercise #5: sleeper stretch cues for arm angle and intensity.  Comment #5: 30\" x 2   Exercise #6: pelvic tilt held secondary to pain  Comment #6:    Exercise #7: bridging low amplitude- held secondary to pain  Comment #7:    Exercise #8: partial curl-up  Comment #8: x 10  Exercise #9: double knee to the chest  Comment #9: 30\" x 3  Exercise #10: LTR  Comment #10: 10 x 3   Exercise #11: Rotary Torso 90 seconds 40#'s  Comment #11: Started to the R  Exercise #12: Doorway ER  Comment #12: X 10   Exercise #13: Serratus wall slide  Comment #13: X 10 orange  Exercise #14: D1 strengthening  Comment #14: X 10 yellow  Exercise #15: Physioball back extensions  Comment #15: X 10   Exercise #16: TA Deadbug Marching  Comment #16: X 10   Exercise #17: I's  Comment #17: X 10   Exercise #18: Quadruped Arm/Leg Extension  Comment #18: x 10        Treatment Today    TREATMENT MINUTES COMMENTS   Evaluation     Self-care/ Home management   "   Manual therapy Not done today Inferior and posterior GH joint mobilizations grade III and IV. Cross friction massage to biceps tendon.   Neuromuscular Re-education     Therapeutic Activity     Therapeutic Exercises 27 MEDX rotary torso and HEP   Gait training     Modality__________________                Total 27    Blank areas are intentional and mean the treatment did not include these items.       Samuel DIAS, PT  10/4/2017

## 2021-06-13 NOTE — PROGRESS NOTES
[facility] Daily Progress     Patient Name: García Trevizo  Date: 9/27/2017  Visit #: 12/12  [employer] Authorization date 10/8/17  Referral Diagnosis:  Acute left sided low back pain without sciatica, left shoulder pain.  Referring provider: Caesar Lewis DO  Visit Diagnosis:     ICD-10-CM    1. Shoulder impingement, left M75.42    2. Decreased strength M62.81    3. Chronic bilateral low back pain without sciatica M54.5     G89.29          Assessment:     Patient returns to PT with focus on strengthening with MEDX. He was tested and was weak for his demographic with this. He would like to hold on injection and trial PT before an injection. His low back is responding well overall the MEDX. He will be getting an injection for his shoulder which will hopefully help to reduce inflammation to help with healing and function.    Will plan to see patient 1X/week for 2 weeks after this week to spread out his visits.    Goal Status:  Pt. will demonstrate/verbalize independence in self-management of condition in : 4 weeks  Pt. will be independent with home exercise program in : 4 weeks  Pt. will have improved quality of sleep: with less pain;waking less times/night;in 4 weeks  Pt. will be able to walk : 20 minutes;with no pain;for exercise/recreation;in 4 weeks  Patient will stand : 30 minutes;with less pain;with less difficultty;for home chores;in 4 weeks  Patient will sit: 30 minutes;for eating;with no pain;in 4 weeks  Patient will reach / maintain arm movement: forward;overhead;with no pain;in 4 weeks  No Data Recorded    Plan / Patient Education:     Continue with initial plan of care.  Progress with home program as tolerated.   Scapular stabilization exercises, progress core, leg pull trial PRN  MEDX continue    Subjective:     Pain Rating:     Saw orthopedics yesterday, think it is some impingement and subacromial bursitis. Having the injection next Monday for the shoulder.     Objective:     Trial of quadruped  "strengthening today tolerated well.   Discussed importance of loading shoulder to help with tendonitis.    MED X Testing Lumbar Initial 8/28/17 4 week re-test -   AROM (full ROM 0-72) 0-48    Max Torque  185#    Avg Torque  143#    Flex/ext Ratio (ideal 1.4:1) 2.13:1        Exercises below represent all exercise the patient has done in the clinic and HEP.  Please see today's exercise flow-sheet for specifics of today's exercise performance.     Exercises:  Exercise #1: manual stretching quad, hip flexor, piriformis  Comment #1: 30\" x 2 bilateral  Exercise #2: quad, hamstring, hip flexor, and piriformis stretch   Comment #2:  30\" bilateral each  Exercise #3: pec stretch-cues for keeping weight on feet and not leaning, reduced intiensity for stretch.  Comment #3: 30\" x 2   Exercise #4: scapular retraction  Comment #4: 5  Exercise #5: sleeper stretch cues for arm angle and intensity.  Comment #5: 30\" x 2   Exercise #6: pelvic tilt held secondary to pain  Comment #6:    Exercise #7: bridging low amplitude- held secondary to pain  Comment #7:    Exercise #8: partial curl-up  Comment #8: x 10  Exercise #9: double knee to the chest  Comment #9: 30\" x 3  Exercise #10: LTR  Comment #10: 10 x 3   Exercise #11: Rotary Torso 90 seconds 36#'s  Comment #11: Started to the L  Exercise #12: Doorway ER  Comment #12: X 10   Exercise #13: Serratus wall slide  Comment #13: X 10 orange  Exercise #14: D1 strengthening  Comment #14: X 10 yellow  Exercise #15: Physioball back extensions  Comment #15: X 10   Exercise #16: TA Deadbug Marching  Comment #16: X 10   Exercise #17: I's  Comment #17: X 10        Treatment Today    TREATMENT MINUTES COMMENTS   Evaluation     Self-care/ Home management     Manual therapy Not done today Inferior and posterior GH joint mobilizations grade III and IV. Cross friction massage to biceps tendon.   Neuromuscular Re-education     Therapeutic Activity     Therapeutic Exercises 27 MEDX rotary torso and HEP "   Gait training     Modality__________________                Total 27    Blank areas are intentional and mean the treatment did not include these items.       Samuel DIAS, PT  9/27/2017

## 2021-06-13 NOTE — PROGRESS NOTES
[facility] Daily Progress     Patient Name: García Trevizo  Date: 9/25/2017  Visit #: 12/12  [facility] Authorization date 10/8/17  Referral Diagnosis:  Acute left sided low back pain without sciatica, left shoulder pain.  Referring provider: Caesar Lewis DO  Visit Diagnosis:     ICD-10-CM    1. Shoulder impingement, left M75.42    2. Decreased strength M62.81    3. Chronic bilateral low back pain without sciatica M54.5     G89.29          Assessment:     Patient returns to PT with focus on strengthening with MEDX. He was tested and was weak for his demographic with this. He would like to hold on injection and trial PT before an injection. His low back is responding well overall the MEDX. His shoulder pain continues to remain the same and he will be seeing orthopedics tomorrow for this.     [facility] authorized 4 visits including this visit. Will plan to see patient 1X/week for 2 weeks after this week to spread out his visits.    Goal Status:  Pt. will demonstrate/verbalize independence in self-management of condition in : 4 weeks  Pt. will be independent with home exercise program in : 4 weeks  Pt. will have improved quality of sleep: with less pain;waking less times/night;in 4 weeks  Pt. will be able to walk : 20 minutes;with no pain;for exercise/recreation;in 4 weeks  Patient will stand : 30 minutes;with less pain;with less difficultty;for home chores;in 4 weeks  Patient will sit: 30 minutes;for eating;with no pain;in 4 weeks  Patient will reach / maintain arm movement: forward;overhead;with no pain;in 4 weeks  No Data Recorded    Plan / Patient Education:     Continue with initial plan of care.  Progress with home program as tolerated.   Scapular stabilization exercises, progress core, leg pull trial PRN  MEDX continue    Subjective:     Pain Rating:     The stretching helps with the back. Gets some pain in the back, feels he is able to manage it pretty well. Went on a motorcycle ride that was about 3  "hours, which actually went really well.  Sees orthopedics for the shoulder tomorrow.    Objective:     ER limited to 60 degrees  Progressed strengthening for back and abdominals today.  Catch of pain in shoulder with crossing arms.    MED X Testing Lumbar Initial 8/28/17 4 week re-test -   AROM (full ROM 0-72) 0-48    Max Torque  185#    Avg Torque  143#    Flex/ext Ratio (ideal 1.4:1) 2.13:1        Exercises below represent all exercise the patient has done in the clinic and HEP.  Please see today's exercise flow-sheet for specifics of today's exercise performance.     Exercises:  Exercise #1: manual stretching quad, hip flexor, piriformis  Comment #1: 30\" x 2 bilateral  Exercise #2: quad, hamstring, hip flexor, and piriformis stretch   Comment #2:  30\" bilateral each  Exercise #3: pec stretch-cues for keeping weight on feet and not leaning, reduced intiensity for stretch.  Comment #3: 30\" x 2   Exercise #4: scapular retraction  Comment #4: 5  Exercise #5: sleeper stretch cues for arm angle and intensity.  Comment #5: 30\" x 2   Exercise #6: pelvic tilt held secondary to pain  Comment #6:    Exercise #7: bridging low amplitude- held secondary to pain  Comment #7:    Exercise #8: partial curl-up  Comment #8: x 10  Exercise #9: double knee to the chest  Comment #9: 30\" x 3  Exercise #10: LTR  Comment #10: 10 x 3   Exercise #11: Rotary Torso 90 seconds 36#'s  Comment #11: Started to the L  Exercise #12: Doorway ER  Comment #12: X 10   Exercise #13: Serratus wall slide  Comment #13: X 10 orange  Exercise #14: D1 strengthening  Comment #14: X 10 yellow       Treatment Today    TREATMENT MINUTES COMMENTS   Evaluation     Self-care/ Home management     Manual therapy Not done today Inferior and posterior GH joint mobilizations grade III and IV. Cross friction massage to biceps tendon.   Neuromuscular Re-education     Therapeutic Activity     Therapeutic Exercises 27 MEDX rotary torso and HEP   Gait training   "   Modality__________________                Total 27    Blank areas are intentional and mean the treatment did not include these items.       Samuel DIAS, PT  9/25/2017

## 2021-06-13 NOTE — PROGRESS NOTES
[facility] Daily Progress     Patient Name: García Trevizo  Date: 10/9/2017  Visit #: 12/12 4/4 from [facility].  [facility] Authorization date 10/8/17  Referral Diagnosis:  Acute left sided low back pain without sciatica, left shoulder pain.  Referring provider: Caesar Lewis DO  Visit Diagnosis:     ICD-10-CM    1. Shoulder impingement, left M75.42    2. Decreased strength R53.1    3. Chronic bilateral low back pain without sciatica M54.5     G89.29          Assessment:     The patient has completed the first 4 weeks of MEDX. The patient shows good improvements in strength with MEDX and is now within a SD of the norm. He shows good improvement in overall function and sleep and has developed a well rounded HEP. The patient is appropriate to continue independently at this time.    Goal Status:  Pt. will demonstrate/verbalize independence in self-management of condition in : 4 weeks MET  Pt. will be independent with home exercise program in : 4 weeks Pt. MET  will have improved quality of sleep: with less pain;waking less times/night;in 4 weeks MET  Pt. will be able to walk : 20 minutes;with no pain;for exercise/recreation;in 4 weeks MET  Patient will stand : 30 minutes;with less pain;with less difficultty;for home chores;in 4 weeks MET  Patient will sit: 30 minutes;for eating;with no pain;in 4 weeks MET  Patient will reach / maintain arm movement: forward;overhead;with no pain;in 4 weeks Improved      Plan / Patient Education:     Patient to continue independently at this time and return to referring as needed.    Subjective:     Pain Rating:     Back is doing pretty good. Gets the catch on occasion, but no where nearly as much pain at night. Reports 80% improvement in shoulder, 70% improvement in the back.    Objective:       Shoulder ROM full with flexion/abduction pain with abduction  Shoulder ER on L 60 degrees pain  Lumbar ROM- Mild limit flexion, Mild limit extension.    MED X Testing Lumbar Initial  "8/28/17 4 week re-test 10/9/17   AROM (full ROM 0-72) 0-48 0-54   Max Torque  185# 304#'s   Avg Torque  143# 230#'s   Flex/ext Ratio (ideal 1.4:1) 2.13:1 1.80:1's       Exercises:  Exercise #1: manual stretching quad, hip flexor, piriformis  Comment #1: 30\" x 2 bilateral  Exercise #2: quad, hamstring, hip flexor, and piriformis stretch   Comment #2:  30\" bilateral each  Exercise #3: pec stretch-cues for keeping weight on feet and not leaning, reduced intiensity for stretch.  Comment #3: 30\" x 2   Exercise #4: scapular retraction  Comment #4: 5  Exercise #5: sleeper stretch cues for arm angle and intensity.  Comment #5: 30\" x 2   Exercise #6: pelvic tilt held secondary to pain  Comment #6:    Exercise #7: bridging low amplitude- held secondary to pain  Comment #7:    Exercise #8: partial curl-up  Comment #8: x 10  Exercise #9: double knee to the chest  Comment #9: 30\" x 3  Exercise #10: LTR  Comment #10: 10 x 3   Exercise #11: Rotary Torso 90 seconds 40#'s  Comment #11: Started to the R  Exercise #12: Doorway ER  Comment #12: X 10   Exercise #13: Serratus wall slide  Comment #13: X 10 orange  Exercise #14: D1 strengthening  Comment #14: X 10 yellow  Exercise #15: Physioball back extensions  Comment #15: X 10   Exercise #16: TA Deadbug Marching  Comment #16: X 10   Exercise #17: I's  Comment #17: X 10   Exercise #18: Quadruped Arm/Leg Extension  Comment #18: x 10        Treatment Today    TREATMENT MINUTES COMMENTS   Evaluation     Self-care/ Home management     Manual therapy Not done today Inferior and posterior GH joint mobilizations grade III and IV. Cross friction massage to biceps tendon.   Neuromuscular Re-education     Therapeutic Activity     Therapeutic Exercises 27 MEDX rotary torso and HEP   Gait training     Modality__________________                Total 27    Blank areas are intentional and mean the treatment did not include these items.       Samuel DIAS, PT  10/9/2017     "

## 2021-06-14 NOTE — PROGRESS NOTES
[facility] Discharge Summary  Patient Name: Gacría Trevizo  Date: 11/17/2017  Referral Diagnosis:   Acute left sided low back pain without sciatica, left shoulder pain.  Referring provider: Caesar Lewis DO  Visit Diagnosis:   1. Shoulder impingement, left     2. Decreased strength     3. Chronic bilateral low back pain without sciatica         Goal Status:  Pt. will demonstrate/verbalize independence in self-management of condition in : 4 weeks MET  Pt. will be independent with home exercise program in : 4 weeks Pt. MET  will have improved quality of sleep: with less pain;waking less times/night;in 4 weeks MET  Pt. will be able to walk : 20 minutes;with no pain;for exercise/recreation;in 4 weeks MET  Patient will stand : 30 minutes;with less pain;with less difficultty;for home chores;in 4 weeks MET  Patient will sit: 30 minutes;for eating;with no pain;in 4 weeks MET  Patient will reach / maintain arm movement: forward;overhead;with no pain;in 4 weeks Improved     Patient was seen for 16 visits from 7/10/17 to 10/9/17 with - missed appointments.    The patient met goals and has demonstrated understanding of and independence in the home program for self-care, and progression to next steps.  He will initiate contact if questions or concerns arise.    Therapy will be discontinued at this time.  The patient will need a new referral to resume.    Thank you for your referral.  Samuel DIAS  11/17/2017  9:45 AM

## 2021-06-16 PROBLEM — I10 ESSENTIAL HYPERTENSION: Status: ACTIVE | Noted: 2018-03-13

## 2021-06-16 PROBLEM — F32.A DEPRESSION: Status: ACTIVE | Noted: 2018-03-13

## 2021-06-16 PROBLEM — Z12.11 SCREEN FOR COLON CANCER: Status: ACTIVE | Noted: 2018-03-13

## 2021-06-16 PROBLEM — F41.9 ANXIETY: Status: ACTIVE | Noted: 2018-03-13

## 2021-06-16 NOTE — PROGRESS NOTES
1. Essential hypertension  lisinopril (PRINIVIL,ZESTRIL) 10 MG tablet    Comprehensive Metabolic Panel    Lipid Cascade FASTING   2. Anxiety  Thyroid Stimulating Hormone (TSH)   3. Depression     4. Screen for colon cancer  Cologuard     Med list reconciled    The 10-year ASCVD risk score (Yangclarisa CHAVEZ Jr, et al., 2013) is: 4.7%    Values used to calculate the score:      Age: 52 years      Sex: Male      Is Non- : No      Diabetic: No      Tobacco smoker: No      Systolic Blood Pressure: 158 mmHg      Is BP treated: Yes      HDL Cholesterol: 74 mg/dL      Total Cholesterol: 212 mg/dL    ASSESSMENT/PLAN:     The following high BMI interventions were performed this visit: encouragement to exercise and weight monitoring    1. Essential hypertension    - lisinopril (PRINIVIL,ZESTRIL) 10 MG tablet; Take 1 tablet (10 mg total) by mouth daily.  Dispense: 90 tablet; Refill: 0  - Comprehensive Metabolic Panel  - Lipid Cascade FASTING    2. Anxiety    - Thyroid Stimulating Hormone (TSH)  -JV-7: 16    3. Depression    -PHQ-9: 12    4. Screen for colon cancer    - Cologuard    Return to clinic in 2 weeks for blood pressure check.  Patient initiated on lisinopril therapy 10 mg daily.  Like to hold off on restarting his Zoloft for now until he figures out his job scenario with new insurance coverage.    The visit lasted a total of 25 minutes face to face with the patient.  Over 50% of the time spent counseling and educating the patient about above content.      Jovanna Whitaker NP          SUBJECTIVE:  García Trevizo is a 52 y.o. male who presents for follow-up for his elevated blood pressure.  Patient currently not taking any antihypertensive therapy, he does have a strong family history of hypertension.  When reviewing previous office visit vital signs, patient has had elevated blood pressure reading since June 2017.  He has been checking his blood pressure at home using his home cuff and blood  pressures have been running between 140-150 systolically over 80-90 diastolically.  He denies any chest pain or pressure, vision changes, headaches, fatigue, shortness of breath or weakness.  Patient has been unemployed for the last year, his father  over 6 months ago so his stress level has been fairly elevated.  He has been noticing that his anxiety has become an issue as well as his depression.  He does occasionally suffer from insomnia which he will take a 100 mg tablet of gabapentin for.  He does not take the gabapentin regularly because it leaves him feeling groggy.  He was previously taking Zoloft several years ago, mainly around the time of his divorce.  He was able to wean off the medication.  Narayan 7 score is 16 and PHQ 9 score is 12. Discussed options for restarting the Zoloft today with his blood pressure medication.  Patient states that he does exercise and perform stretching maneuvers regularly, he has gained roughly 5-10 pounds since being unemployed which he attributes his elevated blood pressure to.  He would like to start taking the blood pressure medication today and will focus on diet and exercise to see if he can lose 10 pounds.  He would like to see if the weight loss will be enough for him to come off the blood pressure medication.  If his anxiety and depressive symptoms become an issue for him during that time, he will return to the clinic to discuss resuming SSRI therapy.  He denies any suicidal thoughts today.  Remainder of his vital signs are stable.  He does have a job interview lined up in the next week so he may be employed within the next month.  He is definitely worried about cost of medications at this point in time, I have advised him to speak with his pharmacist about what the best medication would be for his anxiety and depression that he could afford out of pocket if need be.  Agrees with plan of care.  He is due for some lab work today orders placed.  Patient signed paperwork  today for [facility], discussed process surrounding delivery, collecting sample and mailing sample back.   Chief Complaint   Patient presents with     Hypertension     Elevated BP -family histroy of highBP         Patient Active Problem List   Diagnosis     Acute left-sided low back pain without sciatica     Acute pain of left shoulder     Essential hypertension     Anxiety     Depression     Screen for colon cancer       Current Outpatient Prescriptions   Medication Sig Dispense Refill     gabapentin (NEURONTIN) 100 MG capsule Take 100-300 mg by mouth at bedtime. Follow Gabapentin Dosing chart given 30 capsule 3     MULTIVIT-MINERALS/FERROUS FUM (MULTI VITAMIN ORAL) Take by mouth.       TURMERIC ROOT EXTRACT ORAL Take 1 tablet by mouth daily.       BRIAN, ZINGIBER OFFICINALIS, ORAL Take by mouth.       lisinopril (PRINIVIL,ZESTRIL) 10 MG tablet Take 1 tablet (10 mg total) by mouth daily. 90 tablet 0     No current facility-administered medications for this visit.        History   Smoking Status     Former Smoker     Quit date: 3/27/2007   Smokeless Tobacco     Never Used     Comment: very light smoker , only 1  year        REVIEW OF SYSTEMS: Denies radiation, diaphoresis, shortness of breath, dizziness, syncope, nausea, palpitations, and associated with activity.       TOBACCO USE:  History   Smoking Status     Former Smoker     Quit date: 3/27/2007   Smokeless Tobacco     Never Used     Comment: very light smoker , only 1  year      Social History     Social History     Marital status:      Spouse name: N/A     Number of children: 3     Years of education: 14     Occupational History           Resigned 3/2017     Social History Main Topics     Smoking status: Former Smoker     Quit date: 3/27/2007     Smokeless tobacco: Never Used      Comment: very light smoker , only 1  year      Alcohol use Yes     Drug use: No     Sexual activity: Yes     Partners: Female     Other Topics Concern     Not on  file     Social History Narrative    Lives with female partner, 3 sons are independent and employed.           OBJECTIVE:            Vitals:    03/13/18 0824   BP: (!) 158/96   Pulse:    Resp:    Temp:    SpO2:      Weight: 186 lb 8 oz (84.6 kg)  Wt Readings from Last 3 Encounters:   03/13/18 186 lb 8 oz (84.6 kg)   08/17/17 181 lb (82.1 kg)   08/03/17 179 lb (81.2 kg)     Body mass index is 26.2 kg/(m^2).        Physical Exam:  GENERAL APPEARANCE: A&A, NAD, well hydrated, well nourished  HEAD: atraumatic, no deformity  EYES: PERRL, EOM's intact, no redness or swelling  NECK: Supple, without lymphadenopathy, no thyroid mass, no JVD, no bruit  CV: RRR, no M/G/R, no edema   LUNGS: CTAB, normal respiratory effort  ABDOMEN: S&NT, no masses, no organomegaly, BS present x4   SKIN:  Normal skin turgor, no lesions/rashes, warm and dry   PSYCHIATRIC;  Mood appropriate, memory intact, good eye contact, engaged in conversation, hyper focused on anxiety and blood pressure issues, cost of meds

## 2021-06-24 NOTE — PROGRESS NOTES
1. Viral warts, unspecified type     2. Essential hypertension  Basic Metabolic Panel    lisinopril (PRINIVIL,ZESTRIL) 10 MG tablet   3. Anxiety     4. Moderate episode of recurrent major depressive disorder (H)       The 10-year ASCVD risk score ([place] CHAVEZ Jr., et al., 2013) is: 3.8%    Values used to calculate the score:      Age: 53 years      Sex: Male      Is Non- : No      Diabetic: No      Tobacco smoker: No      Systolic Blood Pressure: 124 mmHg      Is BP treated: Yes      HDL Cholesterol: 74 mg/dL      Total Cholesterol: 231 mg/dL      ASSESSMENT/PLAN:     The following high BMI interventions were performed this visit: encouragement to exercise and weight monitoring    1. Viral warts, unspecified type    -debrided using #15 blade scalpel, frozen with liquid nitrogen x5, small area of bleeding cauterized x3. No bleeding post procedure, patient tolerated procedure well    2. Essential hypertension    - Basic Metabolic Panel  - lisinopril (PRINIVIL,ZESTRIL) 10 MG tablet; Take 1 tablet (10 mg total) by mouth daily.  Dispense: 90 tablet; Refill: 1    3. Anxiety    -JV-7: 10  -patient declined medication at this time    4. Moderate episode of recurrent major depressive disorder (H)    -PHQ-9: 10      There are no Patient Instructions on file for this visit.  Medications Discontinued During This Encounter   Medication Reason     BRIAN, ZINGIBER OFFICINALIS, ORAL      TURMERIC ROOT EXTRACT ORAL Therapy completed     lisinopril (PRINIVIL,ZESTRIL) 10 MG tablet Reorder     Return in about 6 months (around 9/1/2019) for anxiety, hypertension, depression.        Jovanna Whitaker NP          SUBJECTIVE:  García Trevizo is a 53 y.o. male who presents for anxiety, depression and hypertension follow-up.    Anxiety/depression.  Current stressors include the death of his father well over a year ago.  He has been arguing with his sister regarding his father's estate, he did not receive any  "inheritance.  He states \"my sisters told it all \".  He did mail her a letter expressing his feelings towards her regarding her actions, she has never responded to him since he sent the letter to her.  He states \"I have pretty much written her off \".  His dog away in August 2018 unexpectedly, his dog was his main  and was 10 years old.  His death was sudden, patient still gets pretty choked up when he is talking about Joss.  He continues to be unemployed, he does work in the [employer] field, he has not found a new job yet that he likes.  The long, winter months have also taken a toll on his anxiety and depressive symptoms as well.  He was previously taking Zoloft but after he lost his job, he went off the medication due to cost.  He feels that overall he is managing his symptoms well without the medication and would like to remain off the sertraline at this time.  He was seeing a mental health counselor after his divorce, he has not returned to see his counselor since that time because again he feels he is managing without it.  Coping strategies include playing, exercising and playing his guitar.  He denies any thoughts of self-harm or plans for suicide today.    Essential hypertension: Well-controlled on current lisinopril dose.  He denies any chest pain, shortness of breath, headaches or lower extremity swelling.  There is a strong family history of hypertension in his family, his father passed away from a heart attack.  He does drink 7-8 alcoholic beverages per week, he was having issues with binge drinking on the weekends but is no longer practicing that.  He is a non-smoker, he denies illicit drug use.  He is working on getting back into exercising, he is only able to lift light weights due to a previous left shoulder injury.  He was seeing physical therapy for this.  He is able to jump on his elliptical several days per week.     Wart: Acute on the right forearm, he first noticed it in August 2018.  The " wart has been present consistently, it has gotten a little bigger since it started.  He has applied anywhere from 10-15 L of Compound W on it since it started in August.  It does not itch or cause him any discomfort, he has not noticed any bleeding coming from the area.  Site was cleaned and prepped, area was debrided using a #15 blade scalpel.  Site was frozen x5 using liquid nitrogen, small area of bleeding cauterized x3.  Patient tolerated procedure without difficulty today.  Chief Complaint   Patient presents with     Spot     Wart like appearance on RT arm/wrist area     Follow-up     Med Check         Patient Active Problem List   Diagnosis     Acute left-sided low back pain without sciatica     Acute pain of left shoulder     Essential hypertension     Anxiety     Depression     Screen for colon cancer       Current Outpatient Medications   Medication Sig Dispense Refill     gabapentin (NEURONTIN) 100 MG capsule Take 100-300 mg by mouth at bedtime. Follow Gabapentin Dosing chart given 30 capsule 3     lisinopril (PRINIVIL,ZESTRIL) 10 MG tablet Take 1 tablet (10 mg total) by mouth daily. 90 tablet 1     MULTIVIT-MINERALS/FERROUS FUM (MULTI VITAMIN ORAL) Take by mouth.       No current facility-administered medications for this visit.        Social History     Tobacco Use   Smoking Status Former Smoker     Last attempt to quit: 3/27/2007     Years since quittin.9   Smokeless Tobacco Never Used   Tobacco Comment    very light smoker , only 1  year        REVIEW OF SYSTEMS: Denies radiation, diaphoresis, shortness of breath, dizziness, syncope, nausea, palpitations, and associated with activity.     TOBACCO USE:  Social History     Tobacco Use   Smoking Status Former Smoker     Last attempt to quit: 3/27/2007     Years since quittin.9   Smokeless Tobacco Never Used   Tobacco Comment    very light smoker , only 1  year      Social History     Socioeconomic History     Marital status:      Spouse  name: Not on file     Number of children: 3     Years of education: 14     Highest education level: Not on file   Occupational History     Occupation:      Comment: Resigned 3/2017   Social Needs     Financial resource strain: Not on file     Food insecurity:     Worry: Not on file     Inability: Not on file     Transportation needs:     Medical: Not on file     Non-medical: Not on file   Tobacco Use     Smoking status: Former Smoker     Last attempt to quit: 3/27/2007     Years since quittin.9     Smokeless tobacco: Never Used     Tobacco comment: very light smoker , only 1  year    Substance and Sexual Activity     Alcohol use: Yes     Drug use: No     Sexual activity: Yes     Partners: Female   Lifestyle     Physical activity:     Days per week: Not on file     Minutes per session: Not on file     Stress: Not on file   Relationships     Social connections:     Talks on phone: Not on file     Gets together: Not on file     Attends Mosque service: Not on file     Active member of club or organization: Not on file     Attends meetings of clubs or organizations: Not on file     Relationship status: Not on file     Intimate partner violence:     Fear of current or ex partner: Not on file     Emotionally abused: Not on file     Physically abused: Not on file     Forced sexual activity: Not on file   Other Topics Concern     Not on file   Social History Narrative    Lives with female partner, 3 sons are independent and employed.           OBJECTIVE:            Vitals:    19 1049   BP: 124/84   Pulse: 78   Resp: 16   Temp: 97.9  F (36.6  C)   SpO2: 100%     Weight: 184 lb (83.5 kg)    Wt Readings from Last 3 Encounters:   19 184 lb (83.5 kg)   18 186 lb 8 oz (84.6 kg)   17 181 lb (82.1 kg)     Body mass index is 26.03 kg/m .        Physical Exam:  GENERAL APPEARANCE: A&A, NAD, well hydrated, well nourished  HEAD: atraumatic, no deformity  EYES: PERRL, EOM's intact, no redness or  swelling  NECK: Supple, without lymphadenopathy, no thyroid mass, no JVD, no bruit  CV: RRR, no M/G/R, no edema   LUNGS: CTAB, normal respiratory effort  ABDOMEN: S&NT, no masses, no organomegaly, BS present x4   EXTREMITY: right forearm with pea sized wart. Site debrided, frozen and small area cauterized. No bleeding post procedure.    SKIN:  Normal skin turgor, no lesions/rashes, warm and dry   NEURO: no gross deficits  PSYCHIATRIC;  Mood appropriate, memory intact, good eye contact, engaged in conversation

## 2021-07-03 NOTE — ADDENDUM NOTE
Addendum Note by Jovanna Whitaker NP at 8/1/2017 11:44 AM     Author: Jovanna Whitaker NP Service: -- Author Type: Nurse Practitioner    Filed: 8/1/2017 11:44 AM Encounter Date: 6/29/2017 Status: Signed    : Jovanna Whitaker NP (Nurse Practitioner)    Addended by: JOVANNA WHITAKER on: 8/1/2017 11:44 AM        Modules accepted: Orders

## 2021-07-03 NOTE — ADDENDUM NOTE
Addendum Note by Jamilah Olivier CMA at 8/3/2017  2:20 PM     Author: Jamilah Olivier CMA Service: -- Author Type: Certified Medical Assistant    Filed: 8/3/2017  2:20 PM Date of Service: 8/3/2017  2:20 PM Status: Signed    : Jamilah Olivier CMA (Certified Medical Assistant)    Encounter addended by: Jamilah Olivier CMA on: 8/3/2017  2:20 PM<BR>     Actions taken: Vitals modified

## 2021-09-01 ENCOUNTER — TELEPHONE (OUTPATIENT)
Dept: FAMILY MEDICINE | Facility: CLINIC | Age: 56
End: 2021-09-01

## 2021-09-01 NOTE — TELEPHONE ENCOUNTER
Request from [facility] for New Rx:    Atorvastatin 10MG Tabs - 90 day supply    9/1/2021

## 2021-09-05 ENCOUNTER — MYC REFILL (OUTPATIENT)
Dept: FAMILY MEDICINE | Facility: CLINIC | Age: 56
End: 2021-09-05

## 2021-09-05 DIAGNOSIS — I10 ESSENTIAL HYPERTENSION: ICD-10-CM

## 2021-09-05 NOTE — LETTER
García Trevizo  [address] [facility] [place] [address]      September 14, 2021      Dear García Trevizo,    Per our refill protocol, you are due for a physical visit. Your prescription for lisinopril 10mg was sent to your pharmacy on 9/7/21. Please call [phone] to schedule an office visit with Kye Green CNP before your next refill is needed to avoid delays.        Thank you,        [facility]

## 2021-09-06 NOTE — TELEPHONE ENCOUNTER
"Routing refill request to provider for review/approval because:  Labs not current:  Creatinine and potassium  Blood pressure not current    Last Written Prescription Date:  8/11/2020  Last Fill Quantity: 90,  # refills: 3   Last office visit provider:  8/11/2020 Kye Green NP     isinopriL (PRINIVIL,ZESTRIL) 10 MG tablet 90 tablet 3 8/11/2020  No   Sig: TAKE 1 TABLET DAILY   Sent to pharmacy as: lisinopriL 10 mg tablet (PRINIVIL,ZESTRIL)   E-Prescribing Status: Receipt confirmed by pharmacy (8/11/2020  4:48 PM CDT         Requested Prescriptions   Pending Prescriptions Disp Refills     lisinopril (ZESTRIL) 10 MG tablet 90 tablet 3       ACE Inhibitors (Including Combos) Protocol Failed - 9/5/2021  9:10 AM        Failed - Blood pressure under 140/90 in past 12 months     BP Readings from Last 3 Encounters:   08/11/20 136/80                 Failed - Recent (12 mo) or future (30 days) visit within the authorizing provider's specialty     Patient has had an office visit with the authorizing provider or a provider within the authorizing providers department within the previous 12 mos or has a future within next 30 days. See \"Patient Info\" tab in inbasket, or \"Choose Columns\" in Meds & Orders section of the refill encounter.              Failed - Normal serum creatinine on file in past 12 months     Recent Labs   Lab Test 08/11/20  1658   CR 0.87       Ok to refill medication if creatinine is low          Failed - Normal serum potassium on file in past 12 months     Recent Labs   Lab Test 08/11/20  1658   POTASSIUM 4.0             Passed - Medication is active on med list        Passed - Patient is age 18 or older             Padmini Witt RN 09/05/21 8:51 PM  "

## 2021-09-07 RX ORDER — LISINOPRIL 10 MG/1
10 TABLET ORAL DAILY
Qty: 90 TABLET | Refills: 0 | Status: SHIPPED | OUTPATIENT
Start: 2021-09-07 | End: 2021-11-11

## 2021-09-07 NOTE — TELEPHONE ENCOUNTER
Please reach out to patient.  We just hit the 1 year david and patient is due for annual physical.  Please help schedule.  90-day prescription sent.    Kye Green NP

## 2021-09-08 ENCOUNTER — MYC MEDICAL ADVICE (OUTPATIENT)
Dept: FAMILY MEDICINE | Facility: CLINIC | Age: 56
End: 2021-09-08

## 2021-09-08 NOTE — TELEPHONE ENCOUNTER
Left message to call back for: Pt  Information to relay to patient: Needs to schedule annual physical.

## 2021-09-10 NOTE — TELEPHONE ENCOUNTER
Left message to call back for: Pt.   Information to relay to patient: Information below.     Tessie Garcia, CMA

## 2021-09-10 NOTE — TELEPHONE ENCOUNTER
Please call patients pharmacy and find out why they think we've sent in prescriptions for atorvastatin for Agrcía because I don't have anything in his chart to reflect this. Was this an error on their side?    Kye Green, CNP

## 2021-09-10 NOTE — TELEPHONE ENCOUNTER
[facility].   Atorvastatin was requested by patient on their end then canceled.

## 2021-09-18 ENCOUNTER — HEALTH MAINTENANCE LETTER (OUTPATIENT)
Age: 56
End: 2021-09-18

## 2021-10-29 ENCOUNTER — OFFICE VISIT (OUTPATIENT)
Dept: FAMILY MEDICINE | Facility: CLINIC | Age: 56
End: 2021-10-29
Payer: COMMERCIAL

## 2021-10-29 VITALS
HEART RATE: 65 BPM | BODY MASS INDEX: 24.48 KG/M2 | WEIGHT: 171 LBS | OXYGEN SATURATION: 97 % | SYSTOLIC BLOOD PRESSURE: 148 MMHG | HEIGHT: 70 IN | DIASTOLIC BLOOD PRESSURE: 80 MMHG

## 2021-10-29 DIAGNOSIS — I10 ESSENTIAL HYPERTENSION: ICD-10-CM

## 2021-10-29 DIAGNOSIS — F33.1 MODERATE EPISODE OF RECURRENT MAJOR DEPRESSIVE DISORDER (H): ICD-10-CM

## 2021-10-29 DIAGNOSIS — Z12.11 SCREEN FOR COLON CANCER: ICD-10-CM

## 2021-10-29 DIAGNOSIS — Z13.220 LIPID SCREENING: ICD-10-CM

## 2021-10-29 DIAGNOSIS — Z00.00 ROUTINE GENERAL MEDICAL EXAMINATION AT A HEALTH CARE FACILITY: Primary | ICD-10-CM

## 2021-10-29 DIAGNOSIS — Z12.5 SCREENING FOR PROSTATE CANCER: ICD-10-CM

## 2021-10-29 LAB
ANION GAP SERPL CALCULATED.3IONS-SCNC: 11 MMOL/L (ref 5–18)
BUN SERPL-MCNC: 13 MG/DL (ref 8–22)
CALCIUM SERPL-MCNC: 9.8 MG/DL (ref 8.5–10.5)
CHLORIDE BLD-SCNC: 107 MMOL/L (ref 98–107)
CHOLEST SERPL-MCNC: 221 MG/DL
CO2 SERPL-SCNC: 23 MMOL/L (ref 22–31)
CREAT SERPL-MCNC: 1 MG/DL (ref 0.7–1.3)
FASTING STATUS PATIENT QL REPORTED: ABNORMAL
GFR SERPL CREATININE-BSD FRML MDRD: 84 ML/MIN/1.73M2
GLUCOSE BLD-MCNC: 95 MG/DL (ref 70–125)
HDLC SERPL-MCNC: 84 MG/DL
LDLC SERPL CALC-MCNC: 124 MG/DL
POTASSIUM BLD-SCNC: 4.5 MMOL/L (ref 3.5–5)
PSA SERPL-MCNC: 1.02 UG/L (ref 0–3.5)
SODIUM SERPL-SCNC: 141 MMOL/L (ref 136–145)
TRIGL SERPL-MCNC: 67 MG/DL

## 2021-10-29 PROCEDURE — 99396 PREV VISIT EST AGE 40-64: CPT | Performed by: NURSE PRACTITIONER

## 2021-10-29 PROCEDURE — 36415 COLL VENOUS BLD VENIPUNCTURE: CPT | Performed by: NURSE PRACTITIONER

## 2021-10-29 PROCEDURE — 80061 LIPID PANEL: CPT | Performed by: NURSE PRACTITIONER

## 2021-10-29 PROCEDURE — G0103 PSA SCREENING: HCPCS | Performed by: NURSE PRACTITIONER

## 2021-10-29 PROCEDURE — 99213 OFFICE O/P EST LOW 20 MIN: CPT | Mod: 25 | Performed by: NURSE PRACTITIONER

## 2021-10-29 PROCEDURE — 80048 BASIC METABOLIC PNL TOTAL CA: CPT | Performed by: NURSE PRACTITIONER

## 2021-10-29 ASSESSMENT — MIFFLIN-ST. JEOR: SCORE: 1620.87

## 2021-10-29 NOTE — PROGRESS NOTES
SUBJECTIVE:   CC: García Trevizo is an 56 year old male who presents for preventative health visit.     Overall patient is doing okay.  Mood is down a little bit, but nothing too severe.  No SI/HI.  Has used sertraline in the past.  Blood pressure also is uncontrolled today.     Patient has been advised of split billing requirements and indicates understanding: Yes  Healthy Habits:     Getting at least 3 servings of Calcium per day:  NO    Bi-annual eye exam:  Yes    Dental care twice a year:  Yes    Sleep apnea or symptoms of sleep apnea:  Excessive snoring    Diet:  Regular (no restrictions)    Frequency of exercise:  2-3 days/week    Duration of exercise:  15-30 minutes    Taking medications regularly:  Yes    Medication side effects:  None    PHQ-2 Total Score: 2    Additional concerns today:  No      Today's PHQ-2 Score:   PHQ-2 (  [employer]) 10/29/2021   Q1: Little interest or pleasure in doing things 1   Q2: Feeling down, depressed or hopeless 1   PHQ-2 Score 2   Q1: Little interest or pleasure in doing things Several days   Q2: Feeling down, depressed or hopeless Several days   PHQ-2 Score 2     Abuse: Current or Past(Physical, Sexual or Emotional)- No  Do you feel safe in your environment? Yes    Social History     Tobacco Use     Smoking status: Former Smoker     Quit date: 3/27/2007     Years since quittin.6     Smokeless tobacco: Never Used     Tobacco comment: very light smoker , only 1  year   Substance Use Topics     Alcohol use: Not Currently     If you drink alcohol do you typically have >3 drinks per day or >7 drinks per week? Yes    Alcohol Use 10/29/2021   Prescreen: >3 drinks/day or >7 drinks/week? No     AUDIT - Alcohol Use Disorders Identification Test - Reproduced from the World Health Organization Audit 2001 (Second Edition) 10/29/2021   1.  How often do you have a drink containing alcohol? 2 to 3 times a week   2.  How many drinks containing alcohol do you have on a typical day when  you are drinking? 1 or 2   3.  How often do you have five or more drinks on one occasion? Never   4.  How often during the last year have you found that you were not able to stop drinking once you had started? Less than monthly   5.  How often during the last year have you failed to do what was normally expected of you because of drinking? Never   6.  How often during the last year have you needed a first drink in the morning to get yourself going after a heavy drinking session? Never   7.  How often during the last year have you had a feeling of guilt or remorse after drinking? Less than monthly   8.  How often during the last year have you been unable to remember what happened the night before because of your drinking? Never   9.  Have you or someone else been injured because of your drinking? No   10. Has a relative, friend, doctor or other health care worker been concerned about your drinking or suggested you cut down? Yes, but not in the last year   TOTAL SCORE 7     Last PSA:   Prostate Specific Antigen Screen   Date Value Ref Range Status   08/11/2020 1.3 0.0 - 3.5 ng/mL Final       Reviewed orders with patient. Reviewed health maintenance and updated orders accordingly - Yes  Lab work is in process    Reviewed and updated as needed this visit by clinical staff  Tobacco  Allergies  Meds              Reviewed and updated as needed this visit by Provider                  Review of Systems  CONSTITUTIONAL: NEGATIVE for fever, chills, change in weight  INTEGUMENTARY/SKIN: NEGATIVE for worrisome rashes, moles or lesions  EYES: NEGATIVE for vision changes or irritation  ENT: NEGATIVE for ear, mouth and throat problems  RESP: NEGATIVE for significant cough or SOB  CV: NEGATIVE for chest pain, palpitations or peripheral edema  GI: NEGATIVE for nausea, abdominal pain, heartburn, or change in bowel habits   male: negative for dysuria, hematuria, decreased urinary stream, erectile dysfunction, urethral  "discharge  MUSCULOSKELETAL: NEGATIVE for significant arthralgias or myalgia  NEURO: NEGATIVE for weakness, dizziness or paresthesias  PSYCHIATRIC: NEGATIVE for changes in mood or affect    OBJECTIVE:   BP (!) 148/80   Pulse 65   Ht 1.784 m (5' 10.25\")   Wt 77.6 kg (171 lb)   SpO2 97%   BMI 24.36 kg/m      Physical Exam  GENERAL: healthy, alert and no distress  EYES: Eyes grossly normal to inspection, PERRL and conjunctivae and sclerae normal  HENT: ear canals and TM's normal, nose and mouth without ulcers or lesions  NECK: no adenopathy, no asymmetry, masses, or scars and thyroid normal to palpation  RESP: lungs clear to auscultation - no rales, rhonchi or wheezes  CV: regular rate and rhythm, normal S1 S2, no S3 or S4, no murmur, click or rub, no peripheral edema and peripheral pulses strong  ABDOMEN: soft, nontender, no hepatosplenomegaly, no masses and bowel sounds normal  MS: no gross musculoskeletal defects noted, no edema  SKIN: no suspicious lesions or rashes  NEURO: Normal strength and tone, mentation intact and speech normal  PSYCH: mentation appears normal, affect normal/bright    Diagnostic Test Results:  Labs reviewed in Epic    ASSESSMENT/PLAN:       ICD-10-CM    1. Routine general medical examination at a health care facility  Z00.00    2. Essential hypertension  I10 Basic metabolic panel     Basic metabolic panel   3. Screen for colon cancer  Z12.11    4. Lipid screening  Z13.220 Lipid panel     Lipid panel   5. Screening for prostate cancer  Z12.5 PSA, screen     PSA, screen   6. Moderate episode of recurrent major depressive disorder (H)  F33.1      Discussed increasing lisinopril.  He declines today.  Would like to work on getting exercise back in and recheck periodically at home or with nurse blood pressure check.  Question possible sleep apnea given hypertension and excessive snoring history.  He will let me know if interested in investigating this.  Encouraged Covid and flu vaccinations.  " "Discussed different colon cancer screening options.  He will let me know if interested.  Mood is down a little bit, but patient declines intervention right now.  I encouraged him to reach out to me if this worsens over the winter months.  Can always do counseling services and/or reinitiate SSRI therapy.    Patient has been advised of split billing requirements and indicates understanding: Yes  COUNSELING:   Reviewed preventive health counseling, as reflected in patient instructions    Estimated body mass index is 24.36 kg/m  as calculated from the following:    Height as of this encounter: 1.784 m (5' 10.25\").    Weight as of this encounter: 77.6 kg (171 lb).         He reports that he quit smoking about 14 years ago. He has never used smokeless tobacco.      Counseling Resources:  ATP IV Guidelines  Pooled Cohorts Equation Calculator  FRAX Risk Assessment  ICSI Preventive Guidelines  Dietary Guidelines for Americans, 2010  USDA's MyPlate  ASA Prophylaxis  Lung CA Screening    Kye Green, CURT  [facility]  "

## 2021-10-29 NOTE — PATIENT INSTRUCTIONS
If ever interested in investigating possible sleep apnea, let me know and I can get you connected.    Blood pressure is elevated.  Consider checking periodically outside of the clinic.  We are looking for <140/90.  If still elevated after a few months of regular vigorous exercise and eating well, we will want to increase the lisinopril for better cardiovascular protection.    I do encourage getting Covid and flu vaccines.    When open to colonoscopy/colon cancer screening, just let me know.    If mood starts to go south over the winter, let me know how I can help.      Preventive Health Recommendations  Male Ages 50 - 64    Yearly exam:             See your health care provider every year in order to  o   Review health changes.   o   Discuss preventive care.    o   Review your medicines if your doctor has prescribed any.     Have a cholesterol test every 5 years, or more frequently if you are at risk for high cholesterol/heart disease.     Have a diabetes test (fasting glucose) every three years. If you are at risk for diabetes, you should have this test more often.     Have a colonoscopy at age 50, or have a yearly FIT test (stool test). These exams will check for colon cancer.      Talk with your health care provider about whether or not a prostate cancer screening test (PSA) is right for you.    You should be tested each year for STDs (sexually transmitted diseases), if you re at risk.     Shots: Get a flu shot each year. Get a tetanus shot every 10 years.     Nutrition:    Eat at least 5 servings of fruits and vegetables daily.     Eat whole-grain bread, whole-wheat pasta and brown rice instead of white grains and rice.     Get adequate Calcium and Vitamin D.     Lifestyle    Exercise for at least 150 minutes a week (30 minutes a day, 5 days a week). This will help you control your weight and prevent disease.     Limit alcohol to one drink per day.     No smoking.     Wear sunscreen to prevent skin cancer.      See your dentist every six months for an exam and cleaning.     See your eye doctor every 1 to 2 years.

## 2021-10-29 NOTE — LETTER
October 31, 2021      García Trevizo  [address] [place] [address]        Dear ,    We are writing to inform you of your test results.        Resulted Orders   PSA, screen   Result Value Ref Range    Prostate Specific Antigen Screen 1.02 0.00 - 3.50 ug/L    Narrative    Assay Method is Abbott Prostate-Specific Antigen (PSA)  Standard-WHO 1st International (90:10)   Basic metabolic panel   Result Value Ref Range    Sodium 141 136 - 145 mmol/L    Potassium 4.5 3.5 - 5.0 mmol/L    Chloride 107 98 - 107 mmol/L    Carbon Dioxide (CO2) 23 22 - 31 mmol/L    Anion Gap 11 5 - 18 mmol/L    Urea Nitrogen 13 8 - 22 mg/dL    Creatinine 1.00 0.70 - 1.30 mg/dL    Calcium 9.8 8.5 - 10.5 mg/dL    Glucose 95 70 - 125 mg/dL    GFR Estimate 84 >60 mL/min/1.73m2      Comment:      As of July 11, 2021, eGFR is calculated by the CKD-EPI creatinine equation, without race adjustment. eGFR can be influenced by muscle mass, exercise, and diet. The reported eGFR is an estimation only and is only applicable if the renal function is stable.   Lipid panel   Result Value Ref Range    Cholesterol 221 (H) <=199 mg/dL    Triglycerides 67 <=149 mg/dL    Direct Measure HDL 84 >=40 mg/dL      Comment:      HDL Cholesterol Reference Range:     0-2 years:   No reference ranges established for patients under 2 years old  at [facility] for lipid analytes.    2-8 years:  Greater than 45 mg/dL     18 years and older:   Female: Greater than or equal to 50 mg/dL   Male:   Greater than or equal to 40 mg/dL    LDL Cholesterol Calculated 124 <=129 mg/dL    Patient Fasting > 8hrs? Unknown      Kidney function, blood sugar and electrolytes look good.  Screening for prostate cancer is normal.  Cholesterol levels are a little elevated but likely because they were drawn later in the day so no worries!    If you have any questions or concerns, please call the clinic at the number listed above.       Sincerely,      Kye Green,  NP

## 2021-11-01 PROBLEM — F33.1 MODERATE EPISODE OF RECURRENT MAJOR DEPRESSIVE DISORDER (H): Status: ACTIVE | Noted: 2021-11-01

## 2021-11-10 ENCOUNTER — MYC MEDICAL ADVICE (OUTPATIENT)
Dept: FAMILY MEDICINE | Facility: CLINIC | Age: 56
End: 2021-11-10
Payer: COMMERCIAL

## 2021-11-10 DIAGNOSIS — I10 ESSENTIAL HYPERTENSION: ICD-10-CM

## 2021-11-11 RX ORDER — LISINOPRIL 20 MG/1
20 TABLET ORAL DAILY
Qty: 90 TABLET | Refills: 1 | Status: SHIPPED | OUTPATIENT
Start: 2021-11-11 | End: 2022-12-02

## 2021-11-30 ENCOUNTER — MYC MEDICAL ADVICE (OUTPATIENT)
Dept: FAMILY MEDICINE | Facility: CLINIC | Age: 56
End: 2021-11-30
Payer: COMMERCIAL

## 2022-11-19 ENCOUNTER — HEALTH MAINTENANCE LETTER (OUTPATIENT)
Age: 57
End: 2022-11-19

## 2022-12-18 ENCOUNTER — HEALTH MAINTENANCE LETTER (OUTPATIENT)
Age: 57
End: 2022-12-18

## 2023-05-30 ENCOUNTER — TELEPHONE (OUTPATIENT)
Dept: FAMILY MEDICINE | Facility: CLINIC | Age: 58
End: 2023-05-30

## 2023-05-30 DIAGNOSIS — I10 ESSENTIAL HYPERTENSION: ICD-10-CM

## 2023-05-30 NOTE — TELEPHONE ENCOUNTER
Reason for Call:  Appointment Request    Patient requesting this type of appt:  Preventive     Requested provider: Kye Green    Reason patient unable to be scheduled: Not within requested timeframe    When does patient want to be seen/preferred time: 1-2 weeks    Comments: Patient is requesting to schedule yearly physical earlier than 6/28/2023. His medication will run out before scheduled appointment and he would like to be added to a wait list for any upcoming cancellations if possible.    Could we send this information to you in [employer] or would you prefer to receive a phone call?:   Patient would like to be contacted via [employer]    Call taken on 5/30/2023 at 10:30 AM by Zelda Hernandez

## 2023-05-30 NOTE — TELEPHONE ENCOUNTER
Called and spoke with pt. Pt will keep current appointment if Kye Green can bridge medication for 30 days to [facility] per pt request.

## 2023-05-31 RX ORDER — LISINOPRIL 20 MG/1
20 TABLET ORAL DAILY
Qty: 30 TABLET | Refills: 0 | Status: SHIPPED | OUTPATIENT
Start: 2023-05-31 | End: 2023-06-28

## 2023-06-28 ENCOUNTER — OFFICE VISIT (OUTPATIENT)
Dept: FAMILY MEDICINE | Facility: CLINIC | Age: 58
End: 2023-06-28
Payer: COMMERCIAL

## 2023-06-28 ENCOUNTER — LAB (OUTPATIENT)
Dept: FAMILY MEDICINE | Facility: CLINIC | Age: 58
End: 2023-06-28

## 2023-06-28 VITALS
HEART RATE: 76 BPM | TEMPERATURE: 98.2 F | OXYGEN SATURATION: 98 % | SYSTOLIC BLOOD PRESSURE: 128 MMHG | BODY MASS INDEX: 23.65 KG/M2 | WEIGHT: 165.2 LBS | RESPIRATION RATE: 16 BRPM | DIASTOLIC BLOOD PRESSURE: 74 MMHG | HEIGHT: 70 IN

## 2023-06-28 DIAGNOSIS — Z12.11 SCREEN FOR COLON CANCER: ICD-10-CM

## 2023-06-28 DIAGNOSIS — Z00.00 ROUTINE GENERAL MEDICAL EXAMINATION AT A HEALTH CARE FACILITY: Primary | ICD-10-CM

## 2023-06-28 DIAGNOSIS — Z11.59 NEED FOR HEPATITIS C SCREENING TEST: ICD-10-CM

## 2023-06-28 DIAGNOSIS — I10 ESSENTIAL HYPERTENSION: ICD-10-CM

## 2023-06-28 DIAGNOSIS — Z13.220 LIPID SCREENING: ICD-10-CM

## 2023-06-28 DIAGNOSIS — Z12.5 SCREENING FOR PROSTATE CANCER: ICD-10-CM

## 2023-06-28 PROCEDURE — 99396 PREV VISIT EST AGE 40-64: CPT | Performed by: NURSE PRACTITIONER

## 2023-06-28 PROCEDURE — 36415 COLL VENOUS BLD VENIPUNCTURE: CPT | Performed by: NURSE PRACTITIONER

## 2023-06-28 PROCEDURE — G0103 PSA SCREENING: HCPCS | Performed by: NURSE PRACTITIONER

## 2023-06-28 PROCEDURE — 86803 HEPATITIS C AB TEST: CPT | Performed by: NURSE PRACTITIONER

## 2023-06-28 PROCEDURE — 80048 BASIC METABOLIC PNL TOTAL CA: CPT | Performed by: NURSE PRACTITIONER

## 2023-06-28 PROCEDURE — 80061 LIPID PANEL: CPT | Performed by: NURSE PRACTITIONER

## 2023-06-28 RX ORDER — LISINOPRIL 20 MG/1
20 TABLET ORAL DAILY
Qty: 90 TABLET | Refills: 3 | Status: SHIPPED | OUTPATIENT
Start: 2023-06-28 | End: 2023-06-28

## 2023-06-28 RX ORDER — LISINOPRIL 20 MG/1
20 TABLET ORAL DAILY
Qty: 90 TABLET | Refills: 3 | Status: SHIPPED | OUTPATIENT
Start: 2023-06-28 | End: 2024-07-08

## 2023-06-28 ASSESSMENT — ANXIETY QUESTIONNAIRES
4. TROUBLE RELAXING: SEVERAL DAYS
3. WORRYING TOO MUCH ABOUT DIFFERENT THINGS: SEVERAL DAYS
2. NOT BEING ABLE TO STOP OR CONTROL WORRYING: SEVERAL DAYS
5. BEING SO RESTLESS THAT IT IS HARD TO SIT STILL: NOT AT ALL
8. IF YOU CHECKED OFF ANY PROBLEMS, HOW DIFFICULT HAVE THESE MADE IT FOR YOU TO DO YOUR WORK, TAKE CARE OF THINGS AT HOME, OR GET ALONG WITH OTHER PEOPLE?: NOT DIFFICULT AT ALL
1. FEELING NERVOUS, ANXIOUS, OR ON EDGE: SEVERAL DAYS
GAD7 TOTAL SCORE: 5
7. FEELING AFRAID AS IF SOMETHING AWFUL MIGHT HAPPEN: NOT AT ALL
GAD7 TOTAL SCORE: 5
IF YOU CHECKED OFF ANY PROBLEMS ON THIS QUESTIONNAIRE, HOW DIFFICULT HAVE THESE PROBLEMS MADE IT FOR YOU TO DO YOUR WORK, TAKE CARE OF THINGS AT HOME, OR GET ALONG WITH OTHER PEOPLE: NOT DIFFICULT AT ALL
GAD7 TOTAL SCORE: 5
7. FEELING AFRAID AS IF SOMETHING AWFUL MIGHT HAPPEN: NOT AT ALL
6. BECOMING EASILY ANNOYED OR IRRITABLE: SEVERAL DAYS

## 2023-06-28 ASSESSMENT — ENCOUNTER SYMPTOMS
PARESTHESIAS: 0
PALPITATIONS: 0
EYE PAIN: 0
DIZZINESS: 0
FREQUENCY: 0
FEVER: 0
HEMATOCHEZIA: 0
ABDOMINAL PAIN: 0
COUGH: 0
SHORTNESS OF BREATH: 0
CHILLS: 0
WEAKNESS: 0
HEMATURIA: 0
HEADACHES: 0
HEARTBURN: 0
DIARRHEA: 0
MYALGIAS: 1
DYSURIA: 0
CONSTIPATION: 0
ARTHRALGIAS: 1
NAUSEA: 0
SORE THROAT: 0
JOINT SWELLING: 0
NERVOUS/ANXIOUS: 1

## 2023-06-28 ASSESSMENT — PATIENT HEALTH QUESTIONNAIRE - PHQ9
SUM OF ALL RESPONSES TO PHQ QUESTIONS 1-9: 3
SUM OF ALL RESPONSES TO PHQ QUESTIONS 1-9: 3
10. IF YOU CHECKED OFF ANY PROBLEMS, HOW DIFFICULT HAVE THESE PROBLEMS MADE IT FOR YOU TO DO YOUR WORK, TAKE CARE OF THINGS AT HOME, OR GET ALONG WITH OTHER PEOPLE: NOT DIFFICULT AT ALL

## 2023-06-28 ASSESSMENT — PAIN SCALES - GENERAL: PAINLEVEL: NO PAIN (0)

## 2023-06-28 NOTE — PROGRESS NOTES
SUBJECTIVE:   CC: García is an 57 year old who presents for preventative health visit.       2023     2:46 PM   Additional Questions   Roomed by Chantel Hodge CMA     Healthy Habits:     Getting at least 3 servings of Calcium per day:  Yes    Bi-annual eye exam:  NO    Dental care twice a year:  NO    Sleep apnea or symptoms of sleep apnea:  Daytime drowsiness    Diet:  Regular (no restrictions)    Frequency of exercise:  4-5 days/week    Duration of exercise:  15-30 minutes    Taking medications regularly:  Yes    Medication side effects:  None    PHQ-2 Total Score: 1    Additional concerns today:  No    Social History     Tobacco Use     Smoking status: Former     Types: Cigarettes     Quit date: 3/27/2007     Years since quittin.2     Smokeless tobacco: Never     Tobacco comments:     very light smoker , only 1  year   Substance Use Topics     Alcohol use: Not Currently         2023     2:54 PM   Alcohol Use   Prescreen: >3 drinks/day or >7 drinks/week? Not Applicable       Last PSA:   Prostate Specific Antigen Screen   Date Value Ref Range Status   2023 1.08 0.00 - 3.50 ng/mL Final   10/29/2021 1.02 0.00 - 3.50 ug/L Final       Reviewed orders with patient. Reviewed health maintenance and updated orders accordingly - Yes  Lab work is in process    Reviewed and updated as needed this visit by clinical staff    Allergies  Meds              Reviewed and updated as needed this visit by Provider                 Past Medical History:   Diagnosis Date     Anxiety      Hydrocele       Past Surgical History:   Procedure Laterality Date     AMPUTATE FINGER(S) Left     Press accident, 3 fingers     HYDROCELECTOMY SCROTAL         Review of Systems   Constitutional: Negative for chills and fever.   HENT: Negative for congestion, ear pain, hearing loss and sore throat.    Eyes: Negative for pain and visual disturbance.   Respiratory: Negative for cough and shortness of breath.   "  Cardiovascular: Negative for chest pain, palpitations and peripheral edema.   Gastrointestinal: Negative for abdominal pain, constipation, diarrhea, heartburn, hematochezia and nausea.   Genitourinary: Negative for dysuria, frequency, genital sores, hematuria, impotence, penile discharge and urgency.   Musculoskeletal: Positive for arthralgias and myalgias. Negative for joint swelling.   Skin: Negative for rash.   Neurological: Negative for dizziness, weakness, headaches and paresthesias.   Psychiatric/Behavioral: Negative for mood changes. The patient is nervous/anxious.      OBJECTIVE:   /74 (BP Location: Left arm, Patient Position: Sitting, Cuff Size: Adult Regular)   Pulse 76   Temp 98.2  F (36.8  C) (Oral)   Resp 16   Ht 1.784 m (5' 10.25\")   Wt 74.9 kg (165 lb 3.2 oz)   SpO2 98%   BMI 23.54 kg/m      Physical Exam  GENERAL: healthy, alert and no distress  EYES: Eyes grossly normal to inspection, PERRL and conjunctivae and sclerae normal  HENT: ear canals and TM's normal, nose and mouth without ulcers or lesions  NECK: no adenopathy, no asymmetry, masses, or scars and thyroid normal to palpation  RESP: lungs clear to auscultation - no rales, rhonchi or wheezes  CV: regular rate and rhythm, normal S1 S2, no S3 or S4, no murmur, click or rub, no peripheral edema and peripheral pulses strong  ABDOMEN: soft, nontender, no hepatosplenomegaly, no masses and bowel sounds normal  MS: no gross musculoskeletal defects noted, no edema  SKIN: no suspicious lesions or rashes  NEURO: Normal strength and tone, mentation intact and speech normal  PSYCH: mentation appears normal, affect normal/bright    Diagnostic Test Results:  Labs reviewed in Epic    ASSESSMENT/PLAN:       ICD-10-CM    1. Routine general medical examination at a health care facility  Z00.00       2. Essential hypertension  I10 Basic metabolic panel     Basic metabolic panel     lisinopril (ZESTRIL) 20 MG tablet     DISCONTINUED: lisinopril " (ZESTRIL) 20 MG tablet      3. Screen for colon cancer  Z12.11 COLOGUARD(EXACT SCIENCES)      4. Need for hepatitis C screening test  Z11.59 Hepatitis C Screen Reflex to HCV RNA Quant and Genotype     Hepatitis C Screen Reflex to HCV RNA Quant and Genotype      5. Lipid screening  Z13.220 Lipid panel     Lipid panel      6. Screening for prostate cancer  Z12.5 PSA, screen     PSA, screen        Doing well.  Patient is open to Cologuard.  Update screening labs.  Continue same lisinopril.  Reviewed shingles vaccine.    Patient has been advised of split billing requirements and indicates understanding: No      COUNSELING:   Reviewed preventive health counseling, as reflected in patient instructions        He reports that he quit smoking about 16 years ago. He has never used smokeless tobacco.      Kye Green NP  [facility]  Answers for HPI/ROS submitted by the patient on 6/28/2023  If you checked off any problems, how difficult have these problems made it for you to do your work, take care of things at home, or get along with other people?: Not difficult at all  PHQ9 TOTAL SCORE: 3  JV 7 TOTAL SCORE: 5

## 2023-06-28 NOTE — PATIENT INSTRUCTIONS
"Everything is looking good today.    Updating annual blood work.    Cologuard should be mailed to your house over the next month or so.    That shingles vaccine we talked about is called \"Shingrix\".     For patients on Medicare, this is covered at the pharmacy under Medicare part D starting in 2023.    For those with commercial insurance, check with your insurance to see if they cover it. If they do and you're interested in getting it, let me know and we can have you come in for just the shot without having to see me.          Preventive Health Recommendations  Male Ages 50 - 64    Yearly exam:             See your health care provider every year in order to  o   Review health changes.   o   Discuss preventive care.    o   Review your medicines if your doctor has prescribed any.   Have a cholesterol test every 5 years, or more frequently if you are at risk for high cholesterol/heart disease.   Have a diabetes test (fasting glucose) every three years. If you are at risk for diabetes, you should have this test more often.   Have a colonoscopy at age 50, or have a yearly FIT test (stool test). These exams will check for colon cancer.    Talk with your health care provider about whether or not a prostate cancer screening test (PSA) is right for you.  You should be tested each year for STDs (sexually transmitted diseases), if you re at risk.     Shots: Get a flu shot each year. Get a tetanus shot every 10 years.     Nutrition:  Eat at least 5 servings of fruits and vegetables daily.   Eat whole-grain bread, whole-wheat pasta and brown rice instead of white grains and rice.   Get adequate Calcium and Vitamin D.     Lifestyle  Exercise for at least 150 minutes a week (30 minutes a day, 5 days a week). This will help you control your weight and prevent disease.   Limit alcohol to one drink per day.   No smoking.   Wear sunscreen to prevent skin cancer.   See your dentist every six months for an exam and cleaning.   See your " eye doctor every 1 to 2 years.

## 2023-06-29 LAB
ANION GAP SERPL CALCULATED.3IONS-SCNC: 13 MMOL/L (ref 7–15)
BUN SERPL-MCNC: 15.8 MG/DL (ref 6–20)
CALCIUM SERPL-MCNC: 9.6 MG/DL (ref 8.6–10)
CHLORIDE SERPL-SCNC: 103 MMOL/L (ref 98–107)
CHOLEST SERPL-MCNC: 214 MG/DL
CREAT SERPL-MCNC: 1 MG/DL (ref 0.67–1.17)
DEPRECATED HCO3 PLAS-SCNC: 26 MMOL/L (ref 22–29)
GFR SERPL CREATININE-BSD FRML MDRD: 88 ML/MIN/1.73M2
GLUCOSE SERPL-MCNC: 123 MG/DL (ref 70–99)
HDLC SERPL-MCNC: 91 MG/DL
LDLC SERPL CALC-MCNC: 113 MG/DL
NONHDLC SERPL-MCNC: 123 MG/DL
POTASSIUM SERPL-SCNC: 4.5 MMOL/L (ref 3.4–5.3)
PSA SERPL DL<=0.01 NG/ML-MCNC: 1.08 NG/ML (ref 0–3.5)
SODIUM SERPL-SCNC: 142 MMOL/L (ref 136–145)
TRIGL SERPL-MCNC: 52 MG/DL

## 2023-07-01 LAB — HCV AB SERPL QL IA: NONREACTIVE

## 2023-07-29 LAB — NONINV COLON CA DNA+OCC BLD SCRN STL QL: NEGATIVE

## 2024-05-29 ENCOUNTER — PATIENT OUTREACH (OUTPATIENT)
Dept: CARE COORDINATION | Facility: CLINIC | Age: 59
End: 2024-05-29
Payer: COMMERCIAL

## 2024-06-12 ENCOUNTER — PATIENT OUTREACH (OUTPATIENT)
Dept: CARE COORDINATION | Facility: CLINIC | Age: 59
End: 2024-06-12
Payer: COMMERCIAL

## 2024-07-09 DIAGNOSIS — I10 ESSENTIAL HYPERTENSION: ICD-10-CM

## 2024-07-09 RX ORDER — LISINOPRIL 20 MG/1
20 TABLET ORAL DAILY
Qty: 90 TABLET | Refills: 3 | OUTPATIENT
Start: 2024-07-09

## 2024-07-25 SDOH — HEALTH STABILITY: PHYSICAL HEALTH: ON AVERAGE, HOW MANY MINUTES DO YOU ENGAGE IN EXERCISE AT THIS LEVEL?: 60 MIN

## 2024-07-25 SDOH — HEALTH STABILITY: PHYSICAL HEALTH: ON AVERAGE, HOW MANY DAYS PER WEEK DO YOU ENGAGE IN MODERATE TO STRENUOUS EXERCISE (LIKE A BRISK WALK)?: 2 DAYS

## 2024-07-25 ASSESSMENT — PATIENT HEALTH QUESTIONNAIRE - PHQ9
10. IF YOU CHECKED OFF ANY PROBLEMS, HOW DIFFICULT HAVE THESE PROBLEMS MADE IT FOR YOU TO DO YOUR WORK, TAKE CARE OF THINGS AT HOME, OR GET ALONG WITH OTHER PEOPLE: NOT DIFFICULT AT ALL
SUM OF ALL RESPONSES TO PHQ QUESTIONS 1-9: 8
SUM OF ALL RESPONSES TO PHQ QUESTIONS 1-9: 8

## 2024-07-25 ASSESSMENT — SOCIAL DETERMINANTS OF HEALTH (SDOH): HOW OFTEN DO YOU GET TOGETHER WITH FRIENDS OR RELATIVES?: TWICE A WEEK

## 2024-07-26 ENCOUNTER — PATIENT OUTREACH (OUTPATIENT)
Dept: CARE COORDINATION | Facility: CLINIC | Age: 59
End: 2024-07-26

## 2024-07-26 ENCOUNTER — OFFICE VISIT (OUTPATIENT)
Dept: FAMILY MEDICINE | Facility: CLINIC | Age: 59
End: 2024-07-26
Payer: COMMERCIAL

## 2024-07-26 VITALS
HEART RATE: 60 BPM | RESPIRATION RATE: 18 BRPM | DIASTOLIC BLOOD PRESSURE: 80 MMHG | WEIGHT: 178.2 LBS | TEMPERATURE: 98 F | SYSTOLIC BLOOD PRESSURE: 126 MMHG | OXYGEN SATURATION: 98 % | BODY MASS INDEX: 24.95 KG/M2 | HEIGHT: 71 IN

## 2024-07-26 DIAGNOSIS — F33.1 MODERATE EPISODE OF RECURRENT MAJOR DEPRESSIVE DISORDER (H): ICD-10-CM

## 2024-07-26 DIAGNOSIS — Z00.00 ROUTINE GENERAL MEDICAL EXAMINATION AT A HEALTH CARE FACILITY: Primary | ICD-10-CM

## 2024-07-26 DIAGNOSIS — I10 ESSENTIAL HYPERTENSION: ICD-10-CM

## 2024-07-26 DIAGNOSIS — Z13.220 LIPID SCREENING: ICD-10-CM

## 2024-07-26 DIAGNOSIS — Z12.5 SCREENING FOR PROSTATE CANCER: ICD-10-CM

## 2024-07-26 PROBLEM — F32.A DEPRESSION: Status: RESOLVED | Noted: 2018-03-13 | Resolved: 2024-07-26

## 2024-07-26 PROCEDURE — G0103 PSA SCREENING: HCPCS | Performed by: NURSE PRACTITIONER

## 2024-07-26 PROCEDURE — 80061 LIPID PANEL: CPT | Performed by: NURSE PRACTITIONER

## 2024-07-26 PROCEDURE — 80053 COMPREHEN METABOLIC PANEL: CPT | Performed by: NURSE PRACTITIONER

## 2024-07-26 PROCEDURE — 36415 COLL VENOUS BLD VENIPUNCTURE: CPT | Performed by: NURSE PRACTITIONER

## 2024-07-26 PROCEDURE — 99396 PREV VISIT EST AGE 40-64: CPT | Performed by: NURSE PRACTITIONER

## 2024-07-26 RX ORDER — LISINOPRIL 20 MG/1
20 TABLET ORAL DAILY
Qty: 90 TABLET | Refills: 3 | Status: SHIPPED | OUTPATIENT
Start: 2024-07-26 | End: 2024-07-30

## 2024-07-26 ASSESSMENT — PAIN SCALES - GENERAL: PAINLEVEL: NO PAIN (0)

## 2024-07-26 NOTE — PROGRESS NOTES
"Preventive Care Visit  [facility]  Kye Green NP,    Jul 26, 2024      Assessment & Plan       ICD-10-CM    1. Routine general medical examination at a health care facility  Z00.00       2. Essential hypertension  I10 lisinopril (ZESTRIL) 20 MG tablet     Comprehensive metabolic panel (BMP + Alb, Alk Phos, ALT, AST, Total. Bili, TP)     Comprehensive metabolic panel (BMP + Alb, Alk Phos, ALT, AST, Total. Bili, TP)      3. Moderate episode of recurrent major depressive disorder (H)  F33.1       4. Lipid screening  Z13.220 Lipid panel     Lipid panel      5. Screening for prostate cancer  Z12.5 PSA, screen     PSA, screen        Home blood pressures better.  Recheck and report back to me.  Could also schedule nurse BP check.  Update screening/med monitoring labs.  MDD stable/controlled with lifestyle.  If worsening, let me know.  Reviewed if interested in shingles vaccine to double check insurance coverage prior to getting.        BMI  Estimated body mass index is 25.21 kg/m  as calculated from the following:    Height as of this encounter: 1.791 m (5' 10.5\").    Weight as of this encounter: 80.8 kg (178 lb 3.2 oz).       Counseling  Appropriate preventive services were addressed with this patient via screening, questionnaire, or discussion as appropriate for fall prevention, nutrition, physical activity, Tobacco-use cessation, weight loss and cognition.  Checklist reviewing preventive services available has been given to the patient.  Reviewed patient's diet, addressing concerns and/or questions.   He is at risk for lack of exercise and has been provided with information to increase physical activity for the benefit of his well-being.   The patient's PHQ-9 score is consistent with mild depression. He was provided with information regarding depression.     Subjective   Will is a 58 year old, presenting for the following:  Physical (Non fasting)        7/26/2024     4:07 PM "   Additional Questions   Roomed by Chantel Hodge [facility]        Health Care Directive  Patient does not have a Health Care Directive or Living Will: Previously reviewed    HPI    Blood pressure elevated today.  Checks at home and numbers are typically 126/80.  Asymptomatic.  Continues with lisinopril 20 mg.    History of MDD and anxiety.  Well-controlled managing with lifestyle.        7/25/2024   General Health   How would you rate your overall physical health? Good   Feel stress (tense, anxious, or unable to sleep) To some extent      (!) STRESS CONCERN      7/25/2024   Nutrition   Three or more servings of calcium each day? Yes   Diet: Regular (no restrictions)   How many servings of fruit and vegetables per day? (!) 0-1   How many sweetened beverages each day? 0-1            7/25/2024   Exercise   Days per week of moderate/strenous exercise 2 days   Average minutes spent exercising at this level 60 min      (!) EXERCISE CONCERN      7/25/2024   Social Factors   Frequency of gathering with friends or relatives Twice a week   Worry food won't last until get money to buy more No   Food not last or not have enough money for food? No   Do you have housing? (Housing is defined as stable permanent housing and does not include staying ouside in a car, in a tent, in an abandoned building, in an overnight shelter, or couch-surfing.) Yes   Are you worried about losing your housing? No   Lack of transportation? No   Unable to get utilities (heat,electricity)? No          7/25/2024   Fall Risk   Fallen 2 or more times in the past year? No   Trouble with walking or balance? No             7/25/2024   Dental   Dentist two times every year? (!) DECLINE          7/25/2024   TB Screening   Were you born outside of the [place]? No      Today's PHQ-9 Score:       7/25/2024     7:14 PM   PHQ-9 SCORE   PHQ-9 Total Score MyChart 8 (Mild depression)   PHQ-9 Total Score 8         7/25/2024   Substance Use   Alcohol more than 3/day or more than  "7/wk Not Applicable   Do you use any other substances recreationally? No        Social History     Tobacco Use    Smoking status: Former     Current packs/day: 0.00     Types: Cigarettes     Quit date: 3/27/2007     Years since quittin.3    Smokeless tobacco: Never    Tobacco comments:     very light smoker , only 1  year   Substance Use Topics    Alcohol use: Not Currently    Drug use: No         2024   STI Screening   New sexual partner(s) since last STI/HIV test? (!) DECLINE      Last PSA:   Prostate Specific Antigen Screen   Date Value Ref Range Status   2023 1.08 0.00 - 3.50 ng/mL Final   10/29/2021 1.02 0.00 - 3.50 ug/L Final     ASCVD Risk   The 10-year ASCVD risk score (Scott LONG, et al., 2019) is: 7.4%    Values used to calculate the score:      Age: 58 years      Sex: Male      Is Non- : No      Diabetic: No      Tobacco smoker: No      Systolic Blood Pressure: 158 mmHg      Is BP treated: Yes      HDL Cholesterol: 91 mg/dL      Total Cholesterol: 214 mg/dL       Reviewed and updated as needed this visit by Provider                    Past Medical History:   Diagnosis Date    Anxiety     Hydrocele      Past Surgical History:   Procedure Laterality Date    AMPUTATE FINGER(S) Left     Press accident, 3 fingers    HYDROCELECTOMY SCROTAL           Review of Systems  Constitutional, HEENT, cardiovascular, pulmonary, gi and gu systems are negative, except as otherwise noted.     Objective    Exam  BP (!) 158/90 (BP Location: Right arm, Patient Position: Sitting, Cuff Size: Adult Regular)   Pulse 60   Temp 98  F (36.7  C) (Oral)   Resp 18   Ht 1.791 m (5' 10.5\")   Wt 80.8 kg (178 lb 3.2 oz)   SpO2 98%   BMI 25.21 kg/m     Estimated body mass index is 25.21 kg/m  as calculated from the following:    Height as of this encounter: 1.791 m (5' 10.5\").    Weight as of this encounter: 80.8 kg (178 lb 3.2 oz).    Physical Exam  GENERAL: alert and no " distress  EYES: Eyes grossly normal to inspection, PERRL and conjunctivae and sclerae normal  HENT: ear canals and TM's normal, nose and mouth without ulcers or lesions  NECK: no adenopathy, no asymmetry, masses, or scars  RESP: lungs clear to auscultation - no rales, rhonchi or wheezes  CV: regular rate and rhythm, normal S1 S2, no S3 or S4, no murmur, click or rub, no peripheral edema  ABDOMEN: soft, nontender, no hepatosplenomegaly, no masses and bowel sounds normal  MS: no gross musculoskeletal defects noted, no edema  SKIN: no suspicious lesions or rashes  NEURO: Normal strength and tone, mentation intact and speech normal  PSYCH: mentation appears normal, affect normal/bright        Signed Electronically by: Kye Green NP    Answers submitted by the patient for this visit:  Patient Health Questionnaire (Submitted on 7/25/2024)  If you checked off any problems, how difficult have these problems made it for you to do your work, take care of things at home, or get along with other people?: Not difficult at all  PHQ9 TOTAL SCORE: 8

## 2024-07-26 NOTE — PATIENT INSTRUCTIONS
Check your blood pressure at home and report back to me next week.    Refills sent to the pharmacy.      Patient Education   Preventive Care Advice   This is general advice given by our system to help you stay healthy. However, your care team may have specific advice just for you. Please talk to your care team about your preventive care needs.  Nutrition  Eat 5 or more servings of fruits and vegetables each day.  Try wheat bread, brown rice and whole grain pasta (instead of white bread, rice, and pasta).  Get enough calcium and vitamin D. Check the label on foods and aim for 100% of the RDA (recommended daily allowance).  Lifestyle  Exercise at least 150 minutes each week  (30 minutes a day, 5 days a week).  Do muscle strengthening activities 2 days a week. These help control your weight and prevent disease.  No smoking.  Wear sunscreen to prevent skin cancer.  Have a dental exam and cleaning every 6 months.  Yearly exams  See your health care team every year to talk about:  Any changes in your health.  Any medicines your care team has prescribed.  Preventive care, family planning, and ways to prevent chronic diseases.  Shots (vaccines)   HPV shots (up to age 26), if you've never had them before.  Hepatitis B shots (up to age 59), if you've never had them before.  COVID-19 shot: Get this shot when it's due.  Flu shot: Get a flu shot every year.  Tetanus shot: Get a tetanus shot every 10 years.  Pneumococcal, hepatitis A, and RSV shots: Ask your care team if you need these based on your risk.  Shingles shot (for age 50 and up)  General health tests  Diabetes screening:  Starting at age 35, Get screened for diabetes at least every 3 years.  If you are younger than age 35, ask your care team if you should be screened for diabetes.  Cholesterol test: At age 39, start having a cholesterol test every 5 years, or more often if advised.  Bone density scan (DEXA): At age 50, ask your care team if you should have this scan  for osteoporosis (brittle bones).  Hepatitis C: Get tested at least once in your life.  STIs (sexually transmitted infections)  Before age 24: Ask your care team if you should be screened for STIs.  After age 24: Get screened for STIs if you're at risk. You are at risk for STIs (including HIV) if:  You are sexually active with more than one person.  You don't use condoms every time.  You or a partner was diagnosed with a sexually transmitted infection.  If you are at risk for HIV, ask about PrEP medicine to prevent HIV.  Get tested for HIV at least once in your life, whether you are at risk for HIV or not.  Cancer screening tests  Cervical cancer screening: If you have a cervix, begin getting regular cervical cancer screening tests starting at age 21.  Breast cancer scan (mammogram): If you've ever had breasts, begin having regular mammograms starting at age 40. This is a scan to check for breast cancer.  Colon cancer screening: It is important to start screening for colon cancer at age 45.  Have a colonoscopy test every 10 years (or more often if you're at risk) Or, ask your provider about stool tests like a FIT test every year or Cologuard test every 3 years.  To learn more about your testing options, visit:   .  For help making a decision, visit:   https://bit.ly/pp11153.  Prostate cancer screening test: If you have a prostate, ask your care team if a prostate cancer screening test (PSA) at age 55 is right for you.  Lung cancer screening: If you are a current or former smoker ages 50 to 80, ask your care team if ongoing lung cancer screenings are right for you.  For informational purposes only. Not to replace the advice of your health care provider. Copyright   2023 [employer]. All rights reserved. Clinically reviewed by the [facility]. [employer] [id number] - REV 01/24.  Learning About Depression Screening  What is depression screening?  Depression screening is a way to see  "if you have depression symptoms. It may be done by a doctor or counselor. It's often part of a routine checkup. That's because your mental health is just as important as your physical health.  Depression is a mental health condition that affects how you feel, think, and act. You may:  Have less energy.  Lose interest in your daily activities.  Feel sad and grouchy for a long time.  Depression is very common. It affects people of all ages.  Many things can lead to depression. Some people become depressed after they have a stroke or find out they have a major illness like cancer or heart disease. The death of a loved one or a breakup may lead to depression. It can run in families. Most experts believe that a combination of inherited genes and stressful life events can cause it.  What happens during screening?  You may be asked to fill out a form about your depression symptoms. You and the doctor will discuss your answers. The doctor may ask you more questions to learn more about how you think, act, and feel.  What happens after screening?  If you have symptoms of depression, your doctor will talk to you about your options.  Doctors usually treat depression with medicines or counseling. Often, combining the two works best. Many people don't get help because they think that they'll get over the depression on their own. But people with depression may not get better unless they get treatment.  The cause of depression is not well understood. There may be many factors involved. But if you have depression, it's not your fault.  A serious symptom of depression is thinking about death or suicide. If you or someone you care about talks about this or about feeling hopeless, get help right away.  It's important to know that depression can be treated. Medicine, counseling, and self-care may help.  Where can you learn more?  Go to https://www.healthwise.net/patiented  Enter T185 in the search box to learn more about \"Learning About " "Depression Screening.\"  Current as of: June 24, 2023               Content Version: 14.0    [id number] [employer].   Care instructions adapted under license by your healthcare professional. If you have questions about a medical condition or this instruction, always ask your healthcare professional. [employer] disclaims any warranty or liability for your use of this information.         "

## 2024-07-26 NOTE — LETTER
My Depression Action Plan  Name: García Trevizo   Date of Birth 1965  Date: 7/26/2024    My doctor: Kye Green   My clinic: [facility]  [address] [facility], [address]  [place] [address]  [phone]            GREEN    ZONE   Good Control    What it looks like:   Things are going generally well. You have normal ups and downs. You may even feel depressed from time to time, but bad moods usually last less than a day.   What you need to do:  Continue to care for yourself (see self care plan)  Check your depression survival kit and update it as needed  Follow your physician s recommendations including any medication.  Do not stop taking medication unless you consult with your physician first.             YELLOW         ZONE Getting Worse    What it looks like:   Depression is starting to interfere with your life.   It may be hard to get out of bed; you may be starting to isolate yourself from others.  Symptoms of depression are starting to last most all day and this has happened for several days.   You may have suicidal thoughts but they are not constant.   What you need to do:     Call your care team. Your response to treatment will improve if you keep your care team informed of your progress. Yellow periods are signs an adjustment may need to be made.     Continue your self-care.  Just get dressed and ready for the day.  Don't give yourself time to talk yourself out of it.    Talk to someone in your support network.    Open up your Depression Self-Care Plan/Wellness Kit.             RED    ZONE Medical Alert - Get Help    What it looks like:   Depression is seriously interfering with your life.   You may experience these or other symptoms: You can t get out of bed most days, can t work or engage in other necessary activities, you have trouble taking care of basic hygiene, or basic responsibilities, thoughts of suicide or death that  will not go away, self-injurious behavior.     What you need to do:  Call your care team and request a same-day appointment. If they are not available (weekends or after hours) call your local crisis line, emergency room or 911.          Depression Self-Care Plan / Wellness Kit    Many people find that medication and therapy are helpful treatments for managing depression. In addition, making small changes to your everyday life can help to boost your mood and improve your wellbeing. Below are some tips for you to consider. Be sure to talk with your medical provider and/or behavioral health consultant if your symptoms are worsening or not improving.     Sleep   Sleep hygiene  means all of the habits that support good, restful sleep. It includes maintaining a consistent bedtime and wake time, using your bedroom only for sleeping or sex, and keeping the bedroom dark and free of distractions like a computer, smartphone, or television.     Develop a Healthy Routine  Maintain good hygiene. Get out of bed in the morning, make your bed, brush your teeth, take a shower, and get dressed. Don t spend too much time viewing media that makes you feel stressed. Find time to relax each day.    Exercise  Get some form of exercise every day. This will help reduce pain and release endorphins, the  feel good  chemicals in your brain. It can be as simple as just going for a walk or doing some gardening, anything that will get you moving.      Diet  Strive to eat healthy foods, including fruits and vegetables. Drink plenty of water. Avoid excessive sugar, caffeine, alcohol, and other mood-altering substances.     Stay Connected with Others  Stay in touch with friends and family members.    Manage Your Mood  Try deep breathing, massage therapy, biofeedback, or meditation. Take part in fun activities when you can. Try to find something to smile about each day.     Psychotherapy  Be open to working with a therapist if your provider  recommends it.     Medication  Be sure to take your medication as prescribed. Most anti-depressants need to be taken every day. It usually takes several weeks for medications to work. Not all medicines work for all people. It is important to follow-up with your provider to make sure you have a treatment plan that is working for you. Do not stop your medication abruptly without first discussing it with your provider.    Crisis Resources   These hotlines are for both adults and children. They and are open 24 hours a day, 7 days a week unless noted otherwise.    National Suicide Prevention Lifeline   988 or 7-426-676-MZAM (3690)    Crisis Text Line    www.crisistextline.org  Text HOME to 384180 from anywhere in the [place], anytime, about any type of crisis. A live, trained crisis counselor will receive the text and respond quickly.    [employer] Lifeline for LGBTQ Youth  A national crisis intervention and suicide lifeline for LGBTQ youth under 25. Provides a safe place to talk without judgement. Call 1-234.947.9050; text START to 282115 or visit www.thetrevorproject.org to talk to a trained counselor.    For [facility] crisis numbers, visit the [facility] website at:  https://[place].gov/dhs/people-we-serve/adults/health-care/mental-health/resources/crisis-contacts.jsp

## 2024-07-27 LAB
ALBUMIN SERPL BCG-MCNC: 4.6 G/DL (ref 3.5–5.2)
ALP SERPL-CCNC: 62 U/L (ref 40–150)
ALT SERPL W P-5'-P-CCNC: 16 U/L (ref 0–70)
ANION GAP SERPL CALCULATED.3IONS-SCNC: 11 MMOL/L (ref 7–15)
AST SERPL W P-5'-P-CCNC: 16 U/L (ref 0–45)
BILIRUB SERPL-MCNC: 0.4 MG/DL
BUN SERPL-MCNC: 10.8 MG/DL (ref 6–20)
CALCIUM SERPL-MCNC: 9.3 MG/DL (ref 8.8–10.4)
CHLORIDE SERPL-SCNC: 103 MMOL/L (ref 98–107)
CHOLEST SERPL-MCNC: 213 MG/DL
CREAT SERPL-MCNC: 0.93 MG/DL (ref 0.67–1.17)
EGFRCR SERPLBLD CKD-EPI 2021: >90 ML/MIN/1.73M2
FASTING STATUS PATIENT QL REPORTED: YES
FASTING STATUS PATIENT QL REPORTED: YES
GLUCOSE SERPL-MCNC: 93 MG/DL (ref 70–99)
HCO3 SERPL-SCNC: 24 MMOL/L (ref 22–29)
HDLC SERPL-MCNC: 76 MG/DL
LDLC SERPL CALC-MCNC: 121 MG/DL
NONHDLC SERPL-MCNC: 137 MG/DL
POTASSIUM SERPL-SCNC: 4 MMOL/L (ref 3.4–5.3)
PROT SERPL-MCNC: 7.1 G/DL (ref 6.4–8.3)
PSA SERPL DL<=0.01 NG/ML-MCNC: 1.21 NG/ML (ref 0–3.5)
SODIUM SERPL-SCNC: 138 MMOL/L (ref 135–145)
TRIGL SERPL-MCNC: 79 MG/DL

## 2024-07-28 ENCOUNTER — MYC MEDICAL ADVICE (OUTPATIENT)
Dept: FAMILY MEDICINE | Facility: CLINIC | Age: 59
End: 2024-07-28
Payer: COMMERCIAL

## 2024-07-28 DIAGNOSIS — I10 ESSENTIAL HYPERTENSION: ICD-10-CM

## 2024-07-30 RX ORDER — LISINOPRIL 40 MG/1
40 TABLET ORAL DAILY
Qty: 90 TABLET | Refills: 3 | Status: SHIPPED | OUTPATIENT
Start: 2024-07-30

## 2025-07-14 ENCOUNTER — PATIENT OUTREACH (OUTPATIENT)
Dept: CARE COORDINATION | Facility: CLINIC | Age: 60
End: 2025-07-14
Payer: COMMERCIAL

## 2025-07-28 ENCOUNTER — PATIENT OUTREACH (OUTPATIENT)
Dept: CARE COORDINATION | Facility: CLINIC | Age: 60
End: 2025-07-28
Payer: COMMERCIAL